# Patient Record
Sex: MALE | Race: WHITE | Employment: OTHER | URBAN - METROPOLITAN AREA
[De-identification: names, ages, dates, MRNs, and addresses within clinical notes are randomized per-mention and may not be internally consistent; named-entity substitution may affect disease eponyms.]

---

## 2018-01-31 ENCOUNTER — HOSPITAL ENCOUNTER (OUTPATIENT)
Dept: PREADMISSION TESTING | Age: 78
Discharge: HOME | End: 2018-01-31
Payer: MEDICARE

## 2018-01-31 VITALS
WEIGHT: 212 LBS | DIASTOLIC BLOOD PRESSURE: 70 MMHG | RESPIRATION RATE: 20 BRPM | HEART RATE: 67 BPM | SYSTOLIC BLOOD PRESSURE: 152 MMHG | HEIGHT: 73 IN | TEMPERATURE: 97.3 F | BODY MASS INDEX: 28.1 KG/M2

## 2018-01-31 LAB
ANION GAP SERPL CALC-SCNC: 9 MMOL/L (ref 5–15)
BASOPHILS # BLD: 0 K/UL (ref 0–0.1)
BASOPHILS NFR BLD: 0 % (ref 0–1)
BUN SERPL-MCNC: 23 MG/DL (ref 6–20)
BUN/CREAT SERPL: 16 (ref 12–20)
CALCIUM SERPL-MCNC: 9.1 MG/DL (ref 8.5–10.1)
CHLORIDE SERPL-SCNC: 108 MMOL/L (ref 97–108)
CO2 SERPL-SCNC: 25 MMOL/L (ref 21–32)
CREAT SERPL-MCNC: 1.43 MG/DL (ref 0.7–1.3)
DIFFERENTIAL METHOD BLD: ABNORMAL
EOSINOPHIL # BLD: 0.1 K/UL (ref 0–0.4)
EOSINOPHIL NFR BLD: 2 % (ref 0–7)
ERYTHROCYTE [DISTWIDTH] IN BLOOD BY AUTOMATED COUNT: 14.4 % (ref 11.5–14.5)
GLUCOSE SERPL-MCNC: 110 MG/DL (ref 65–100)
HCT VFR BLD AUTO: 47.1 % (ref 36.6–50.3)
HGB BLD-MCNC: 15.2 G/DL (ref 12.1–17)
IMM GRANULOCYTES # BLD: 0 K/UL (ref 0–0.04)
IMM GRANULOCYTES NFR BLD AUTO: 1 % (ref 0–0.5)
LYMPHOCYTES # BLD: 1.3 K/UL (ref 0.8–3.5)
LYMPHOCYTES NFR BLD: 22 % (ref 12–49)
MCH RBC QN AUTO: 29.3 PG (ref 26–34)
MCHC RBC AUTO-ENTMCNC: 32.3 G/DL (ref 30–36.5)
MCV RBC AUTO: 90.8 FL (ref 80–99)
MONOCYTES # BLD: 0.7 K/UL (ref 0–1)
MONOCYTES NFR BLD: 12 % (ref 5–13)
NEUTS SEG # BLD: 3.8 K/UL (ref 1.8–8)
NEUTS SEG NFR BLD: 64 % (ref 32–75)
NRBC # BLD: 0 K/UL (ref 0–0.01)
NRBC BLD-RTO: 0 PER 100 WBC
PLATELET # BLD AUTO: 207 K/UL (ref 150–400)
PMV BLD AUTO: 12 FL (ref 8.9–12.9)
POTASSIUM SERPL-SCNC: 5 MMOL/L (ref 3.5–5.1)
RBC # BLD AUTO: 5.19 M/UL (ref 4.1–5.7)
SODIUM SERPL-SCNC: 142 MMOL/L (ref 136–145)
WBC # BLD AUTO: 5.9 K/UL (ref 4.1–11.1)

## 2018-01-31 PROCEDURE — 80048 BASIC METABOLIC PNL TOTAL CA: CPT | Performed by: SURGERY

## 2018-01-31 PROCEDURE — 36415 COLL VENOUS BLD VENIPUNCTURE: CPT | Performed by: SURGERY

## 2018-01-31 PROCEDURE — 85025 COMPLETE CBC W/AUTO DIFF WBC: CPT | Performed by: SURGERY

## 2018-01-31 RX ORDER — LORATADINE 10 MG/1
10 TABLET ORAL DAILY
COMMUNITY
End: 2020-02-11

## 2018-01-31 RX ORDER — WARFARIN 10 MG/1
10 TABLET ORAL DAILY
COMMUNITY
End: 2020-02-11

## 2018-01-31 RX ORDER — CYANOCOBALAMIN 1000 UG/ML
1000 INJECTION, SOLUTION INTRAMUSCULAR; SUBCUTANEOUS
COMMUNITY
End: 2020-02-11

## 2018-01-31 RX ORDER — TAMSULOSIN HYDROCHLORIDE 0.4 MG/1
0.4 CAPSULE ORAL DAILY
COMMUNITY
End: 2020-02-11

## 2018-01-31 RX ORDER — DUTASTERIDE 0.5 MG/1
0.5 CAPSULE, LIQUID FILLED ORAL DAILY
COMMUNITY

## 2018-02-05 RX ORDER — LIDOCAINE HYDROCHLORIDE AND EPINEPHRINE 10; 10 MG/ML; UG/ML
30 INJECTION, SOLUTION INFILTRATION; PERINEURAL ONCE
Status: CANCELLED | OUTPATIENT
Start: 2018-02-07 | End: 2018-02-07

## 2018-02-05 NOTE — H&P
Chief Complaint: Consultation       Allergies: No Known Drug Allergies (NKDA)       Medications: warfarin 10 mg oral tablet, Allergy (Loratadine) 10 mg oral tablet, tamsulosin 0.4 mg oral capsule, dutasteride 0.5 mg oral capsule, Vitamin B12, Metformin, Fenofibrate, Aspirin 81       Medical History: Height: 6' 1\", Weight: 218 lbs    Pulmonary System: Negative  Cardiac System: Negative  Blood and Liver Systems: Negative  Neurologic and Endocrine Systems: Negative  GI,  and Reproductive Systems: Negative  Cancer: Negative     Surgical History: Prostate surgery  Blood Clot Filter  Hernia Repair       Family History: None Indicated       Social History: Smoking Status: Unknown if ever smoked                Mr. Candace Ngo is a 77-year-old male who is a patient from my previous practice, with a history of melanoma on his scalp. 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 Edmonia Sandhoff office. By their report he had a pigmented lesion present for an unknown period of time and was seen by Dr. Frances Sood for a skin exam.  He underwent a tangential shave biopsy on December 5, the report I have here that states evolving maligna melanoma in situ, lateral margin involved, from the left vertex scalp. He is referred for definitive wide excision. Review of systems reveals normal heart and lung sounds. Examination of his posterior scalp reveals an old healed vertical midline scar that measures almost 6 or 7 cm in length. To the left towards the apex of his scalp approximately 4.5 cm lateral to the well-healed scar is a superficial tangential healing shave biopsy wound without any obvious visible residual pigmentation. I had a discussion with Mr. Arroyo and his daughter who accompanied him today regarding the diagnosis of melanoma in situ and the need for a 5 mm re-excision margin.   This is performed under a conscious sedation setting with local anesthesia on an outpatient basis. I diagrammed on his scalp where the circumferential incision would be made to include an approximate 5 mm margin. The circular defect is converted to an elliptical defect, moderate undermining in the loose areolar plane on top of the periosteum underneath the galea aponeurotica, and the excision wound is closed in layers with sutures. There are limited activity restrictions for a few weeks. Risks and complications can include but are not limited to infection, pain, bleeding, partial or complete loss of flaps or grafts, recurrent skin cancers, the need for additional surgery, and cardiac and pulmonary risks that include death. His questions were answered, and we will schedule a definitive outpatient re-excision of his recent biopsy-proven melanoma in situ on the left apex of the scalp.

## 2018-02-07 ENCOUNTER — ANESTHESIA EVENT (OUTPATIENT)
Dept: MEDSURG UNIT | Age: 78
End: 2018-02-07
Payer: MEDICARE

## 2018-02-07 ENCOUNTER — HOSPITAL ENCOUNTER (OUTPATIENT)
Age: 78
Setting detail: OUTPATIENT SURGERY
Discharge: HOME | End: 2018-02-07
Attending: SURGERY | Admitting: SURGERY
Payer: MEDICARE

## 2018-02-07 ENCOUNTER — ANESTHESIA (OUTPATIENT)
Dept: MEDSURG UNIT | Age: 78
End: 2018-02-07
Payer: MEDICARE

## 2018-02-07 VITALS
DIASTOLIC BLOOD PRESSURE: 80 MMHG | SYSTOLIC BLOOD PRESSURE: 145 MMHG | WEIGHT: 212 LBS | OXYGEN SATURATION: 95 % | BODY MASS INDEX: 28.1 KG/M2 | HEART RATE: 52 BPM | RESPIRATION RATE: 20 BRPM | TEMPERATURE: 97.7 F | HEIGHT: 73 IN

## 2018-02-07 DIAGNOSIS — D03.4 MELANOMA IN SITU OF SCALP (HCC): Primary | ICD-10-CM

## 2018-02-07 LAB
GLUCOSE BLD STRIP.AUTO-MCNC: 103 MG/DL (ref 65–100)
GLUCOSE BLD STRIP.AUTO-MCNC: 109 MG/DL (ref 65–100)
SERVICE CMNT-IMP: ABNORMAL
SERVICE CMNT-IMP: ABNORMAL

## 2018-02-07 PROCEDURE — 76060000061 HC AMB SURG ANES 0.5 TO 1 HR: Performed by: SURGERY

## 2018-02-07 PROCEDURE — 77030031139 HC SUT VCRL2 J&J -A: Performed by: SURGERY

## 2018-02-07 PROCEDURE — 74011250636 HC RX REV CODE- 250/636

## 2018-02-07 PROCEDURE — 77030018836 HC SOL IRR NACL ICUM -A: Performed by: SURGERY

## 2018-02-07 PROCEDURE — 74011250636 HC RX REV CODE- 250/636: Performed by: SURGERY

## 2018-02-07 PROCEDURE — 77030020782 HC GWN BAIR PAWS FLX 3M -B

## 2018-02-07 PROCEDURE — 77030032490 HC SLV COMPR SCD KNE COVD -B: Performed by: SURGERY

## 2018-02-07 PROCEDURE — 88342 IMHCHEM/IMCYTCHM 1ST ANTB: CPT | Performed by: SURGERY

## 2018-02-07 PROCEDURE — 74011250636 HC RX REV CODE- 250/636: Performed by: ANESTHESIOLOGY

## 2018-02-07 PROCEDURE — 76210000034 HC AMBSU PH I REC 0.5 TO 1 HR: Performed by: SURGERY

## 2018-02-07 PROCEDURE — 88305 TISSUE EXAM BY PATHOLOGIST: CPT | Performed by: SURGERY

## 2018-02-07 PROCEDURE — 76030000000 HC AMB SURG OR TIME 0.5 TO 1: Performed by: SURGERY

## 2018-02-07 PROCEDURE — 76210000046 HC AMBSU PH II REC FIRST 0.5 HR: Performed by: SURGERY

## 2018-02-07 PROCEDURE — 74011000250 HC RX REV CODE- 250: Performed by: SURGERY

## 2018-02-07 PROCEDURE — 77030011640 HC PAD GRND REM COVD -A: Performed by: SURGERY

## 2018-02-07 PROCEDURE — 82962 GLUCOSE BLOOD TEST: CPT

## 2018-02-07 PROCEDURE — 77030002916 HC SUT ETHLN J&J -A: Performed by: SURGERY

## 2018-02-07 RX ORDER — SODIUM CHLORIDE 0.9 % (FLUSH) 0.9 %
5-10 SYRINGE (ML) INJECTION AS NEEDED
Status: DISCONTINUED | OUTPATIENT
Start: 2018-02-07 | End: 2018-02-07 | Stop reason: HOSPADM

## 2018-02-07 RX ORDER — LIDOCAINE HYDROCHLORIDE AND EPINEPHRINE 10; 10 MG/ML; UG/ML
INJECTION, SOLUTION INFILTRATION; PERINEURAL AS NEEDED
Status: DISCONTINUED | OUTPATIENT
Start: 2018-02-07 | End: 2018-02-07 | Stop reason: HOSPADM

## 2018-02-07 RX ORDER — MIDAZOLAM HYDROCHLORIDE 1 MG/ML
0.5 INJECTION, SOLUTION INTRAMUSCULAR; INTRAVENOUS
Status: DISCONTINUED | OUTPATIENT
Start: 2018-02-07 | End: 2018-02-07 | Stop reason: HOSPADM

## 2018-02-07 RX ORDER — FENTANYL CITRATE 50 UG/ML
INJECTION, SOLUTION INTRAMUSCULAR; INTRAVENOUS AS NEEDED
Status: DISCONTINUED | OUTPATIENT
Start: 2018-02-07 | End: 2018-02-07 | Stop reason: HOSPADM

## 2018-02-07 RX ORDER — DIPHENHYDRAMINE HYDROCHLORIDE 50 MG/ML
12.5 INJECTION, SOLUTION INTRAMUSCULAR; INTRAVENOUS AS NEEDED
Status: DISCONTINUED | OUTPATIENT
Start: 2018-02-07 | End: 2018-02-07 | Stop reason: HOSPADM

## 2018-02-07 RX ORDER — SODIUM CHLORIDE 9 MG/ML
50 INJECTION, SOLUTION INTRAVENOUS CONTINUOUS
Status: DISCONTINUED | OUTPATIENT
Start: 2018-02-07 | End: 2018-02-07 | Stop reason: HOSPADM

## 2018-02-07 RX ORDER — ONDANSETRON 2 MG/ML
4 INJECTION INTRAMUSCULAR; INTRAVENOUS AS NEEDED
Status: DISCONTINUED | OUTPATIENT
Start: 2018-02-07 | End: 2018-02-07 | Stop reason: HOSPADM

## 2018-02-07 RX ORDER — HYDROCODONE BITARTRATE AND ACETAMINOPHEN 5; 325 MG/1; MG/1
1 TABLET ORAL
Qty: 20 TAB | Refills: 0 | Status: SHIPPED | OUTPATIENT
Start: 2018-02-07 | End: 2018-09-12

## 2018-02-07 RX ORDER — MIDAZOLAM HYDROCHLORIDE 1 MG/ML
1 INJECTION, SOLUTION INTRAMUSCULAR; INTRAVENOUS AS NEEDED
Status: DISCONTINUED | OUTPATIENT
Start: 2018-02-07 | End: 2018-02-07 | Stop reason: HOSPADM

## 2018-02-07 RX ORDER — PROPOFOL 10 MG/ML
INJECTION, EMULSION INTRAVENOUS
Status: DISCONTINUED | OUTPATIENT
Start: 2018-02-07 | End: 2018-02-07 | Stop reason: HOSPADM

## 2018-02-07 RX ORDER — FENTANYL CITRATE 50 UG/ML
25 INJECTION, SOLUTION INTRAMUSCULAR; INTRAVENOUS
Status: DISCONTINUED | OUTPATIENT
Start: 2018-02-07 | End: 2018-02-07 | Stop reason: HOSPADM

## 2018-02-07 RX ORDER — SODIUM CHLORIDE, SODIUM LACTATE, POTASSIUM CHLORIDE, CALCIUM CHLORIDE 600; 310; 30; 20 MG/100ML; MG/100ML; MG/100ML; MG/100ML
125 INJECTION, SOLUTION INTRAVENOUS CONTINUOUS
Status: DISCONTINUED | OUTPATIENT
Start: 2018-02-07 | End: 2018-02-07 | Stop reason: HOSPADM

## 2018-02-07 RX ORDER — CEFAZOLIN SODIUM/WATER 2 G/20 ML
2 SYRINGE (ML) INTRAVENOUS ONCE
Status: COMPLETED | OUTPATIENT
Start: 2018-02-07 | End: 2018-02-07

## 2018-02-07 RX ORDER — SODIUM CHLORIDE 0.9 % (FLUSH) 0.9 %
5-10 SYRINGE (ML) INJECTION EVERY 8 HOURS
Status: DISCONTINUED | OUTPATIENT
Start: 2018-02-07 | End: 2018-02-07 | Stop reason: HOSPADM

## 2018-02-07 RX ORDER — FENTANYL CITRATE 50 UG/ML
50 INJECTION, SOLUTION INTRAMUSCULAR; INTRAVENOUS AS NEEDED
Status: DISCONTINUED | OUTPATIENT
Start: 2018-02-07 | End: 2018-02-07 | Stop reason: HOSPADM

## 2018-02-07 RX ORDER — LIDOCAINE HYDROCHLORIDE 10 MG/ML
0.1 INJECTION, SOLUTION EPIDURAL; INFILTRATION; INTRACAUDAL; PERINEURAL AS NEEDED
Status: DISCONTINUED | OUTPATIENT
Start: 2018-02-07 | End: 2018-02-07 | Stop reason: HOSPADM

## 2018-02-07 RX ORDER — PROPOFOL 10 MG/ML
INJECTION, EMULSION INTRAVENOUS AS NEEDED
Status: DISCONTINUED | OUTPATIENT
Start: 2018-02-07 | End: 2018-02-07 | Stop reason: HOSPADM

## 2018-02-07 RX ORDER — OXYCODONE AND ACETAMINOPHEN 5; 325 MG/1; MG/1
1 TABLET ORAL AS NEEDED
Status: DISCONTINUED | OUTPATIENT
Start: 2018-02-07 | End: 2018-02-07 | Stop reason: HOSPADM

## 2018-02-07 RX ORDER — SODIUM CHLORIDE 9 MG/ML
1000 INJECTION, SOLUTION INTRAVENOUS CONTINUOUS
Status: DISCONTINUED | OUTPATIENT
Start: 2018-02-07 | End: 2018-02-07 | Stop reason: HOSPADM

## 2018-02-07 RX ORDER — MORPHINE SULFATE 10 MG/ML
2 INJECTION, SOLUTION INTRAMUSCULAR; INTRAVENOUS
Status: DISCONTINUED | OUTPATIENT
Start: 2018-02-07 | End: 2018-02-07 | Stop reason: HOSPADM

## 2018-02-07 RX ADMIN — Medication 2 G: at 13:10

## 2018-02-07 RX ADMIN — FENTANYL CITRATE 25 MCG: 50 INJECTION, SOLUTION INTRAMUSCULAR; INTRAVENOUS at 13:05

## 2018-02-07 RX ADMIN — PROPOFOL 30 MG: 10 INJECTION, EMULSION INTRAVENOUS at 13:05

## 2018-02-07 RX ADMIN — SODIUM CHLORIDE, SODIUM LACTATE, POTASSIUM CHLORIDE, AND CALCIUM CHLORIDE 125 ML/HR: 600; 310; 30; 20 INJECTION, SOLUTION INTRAVENOUS at 12:14

## 2018-02-07 RX ADMIN — PROPOFOL 20 MCG/KG/MIN: 10 INJECTION, EMULSION INTRAVENOUS at 13:01

## 2018-02-07 NOTE — ANESTHESIA PREPROCEDURE EVALUATION
Anesthetic History   No history of anesthetic complications            Review of Systems / Medical History  Patient summary reviewed, nursing notes reviewed and pertinent labs reviewed    Pulmonary  Within defined limits                 Neuro/Psych   Within defined limits           Cardiovascular  Within defined limits          Dysrhythmias            GI/Hepatic/Renal  Within defined limits              Endo/Other  Within defined limits  Diabetes    Arthritis and cancer     Other Findings              Physical Exam    Airway  Mallampati: II  TM Distance: > 6 cm  Neck ROM: normal range of motion   Mouth opening: Normal     Cardiovascular  Regular rate and rhythm,  S1 and S2 normal,  no murmur, click, rub, or gallop             Dental  No notable dental hx       Pulmonary  Breath sounds clear to auscultation               Abdominal  GI exam deferred       Other Findings            Anesthetic Plan    ASA: 2  Anesthesia type: MAC          Induction: Intravenous  Anesthetic plan and risks discussed with: Patient

## 2018-02-07 NOTE — PROGRESS NOTES
Gave discharge instructions and prescription to patient and his nephew, questions answered and supplies given

## 2018-02-07 NOTE — OP NOTES
[facility]  ACUTE CARE OP NOTE    Name:Ashley BHARDWAJ SR..  MR#: [id number]  : 1940  ACCOUNT #: [de-identified]   DATE OF SERVICE: 2018    PREOPERATIVE DIAGNOSIS:  Malignant melanoma in situ, left apical scalp, inadequate excision margins. POSTOPERATIVE DIAGNOSIS:  Malignant melanoma in situ, left apical scalp, inadequate excision margins. PROCEDURE PERFORMED:  Definitive wide local excision with 5 mm circumferential excision margin melanoma in situ, left apical scalp, and complex closure to defect, left apical scalp, approximately 8 x 2.5 cm. SURGEON:  Dr. Dorinda Cain. ASSISTANT:  None. STAFF:  OR staff. ANESTHESIA:  IV sedation with local anesthesia. ESTIMATED BLOOD LOSS:  2 mL. INTRAVENOUS FLUIDS:  Less than or equal to a liter. SPECIMENS REMOVED:  Melanoma in situ, left apical scalp, sent to pathology for permanent analysis. COMPLICATIONS:  None. IMPLANTS:  None. INDICATION FOR PROCEDURE:  The patient is a pleasant 68-year-old male referred by Dr. Carlos Enrique Boyd with a recent diagnosis of a melanoma in situ on his left apical scalp with an inadequate excision margin. He has a longstanding history fair complexion and skin cancers and is seen by Dr. Nohemy Keller for followup skin surveillance exams. He was seen on 2017 and had a shave excision to an atypical pigmented lesion on apical of scalp to the left side of a well-healed vertical scar from a previous skin cancer excision. Pathology report revealed this was an evolving melanoma in situ with recommendation of definitive wide excision. He comes in today for that procedure. OPERATIVE PROCEDURE:  After appropriate consent was obtained, preoperative markings were placed. He was taken to the operating room and placed on the operative table in supine position. IV sedation was provided. 1% lidocaine with epinephrine was injected.   The area was sterilely prepped and draped. Based on our markings, all skin incisions were made with a #10 scalpel blade. Definitive full-thickness excision of the previous biopsy with a circumferential margin was then marked with a suture at 12 o'clock, which represented the most anterior short axis margin. It was sent to pathology for permanent analysis. The scalp flaps were then undermined in a loose areolar plane below the galea aponeurotica and on top of the periosteum for a distance of multiple centimeters in all directions. The wound was then closed in a complex fashion with 3-0 Vicryl in the galea fascial layer as an inverted interrupted stitch and then the skin was reapproximated with 4-0 nylon in vertical mattress sutures and then every other one was a simple 4-0 nylon suture. Bacitracin and a gauze bandage was placed. At the end of procedure, sponge and needle counts were reported as correct. He was awakened and transferred to recovery room in satisfactory and stable condition. He will be discharged home today in the care of his family with prescriptions and instructions and he will follow up with me within 2 weeks.       Norma Vanessa MD       [address] Leann Zabala / Elle Harrison  D: 02/07/2018 13:54     T: 02/07/2018 14:27  JOB #: [id number]

## 2018-02-07 NOTE — ROUTINE PROCESS
Patient: Chris Fields. MRN: [id number]  SSN: [id number]   YOB: 1940  Age: 76 y.o. Sex: male     Patient is status post Procedure(s):  WIDE LOCAL EXCISION MELANOMA  IN SITU LEFT SCALP .     Surgeon(s) and Role:     * Kadeem Silverman MD - Primary    Local/Dose/Irrigation: SEE MAR                  Peripheral IV 02/07/18 Left Hand (Active)                           Dressing/Packing:  Wound Scalp-DRESSING TYPE:  (4X4,HYPAFIX TAPE) (02/07/18 1300)  Splint/Cast:  ]    Other:

## 2018-02-07 NOTE — ANESTHESIA POSTPROCEDURE EVALUATION
Post-Anesthesia Evaluation and Assessment    Patient: Genoveva De La Garza Sr. MRN: [id number]  SSN: [id number]    YOB: 1940  Age: 76 y.o. Sex: male       Cardiovascular Function/Vital Signs  Visit Vitals    /67    Pulse 61    Temp 36.5 °C (97.7 °F)    Resp 17    Ht 6' 1\" (1.854 m)    Wt 96.2 kg (212 lb)    SpO2 99%    BMI 27.97 kg/m2       Patient is status post MAC anesthesia for Procedure(s):  WIDE LOCAL EXCISION MELANOMA  IN SITU LEFT SCALP . Nausea/Vomiting: None    Postoperative hydration reviewed and adequate. Pain:  Pain Scale 1: Numeric (0 - 10) (02/07/18 1400)  Pain Intensity 1: 0 (02/07/18 1400)   Managed    Neurological Status:   Neuro (WDL): Within Defined Limits (02/07/18 1400)  Neuro  LUE Motor Response: Purposeful (02/07/18 1400)  LLE Motor Response: Purposeful (02/07/18 1400)  RUE Motor Response: Purposeful (02/07/18 1400)  RLE Motor Response: Purposeful (02/07/18 1400)   At baseline    Mental Status and Level of Consciousness: Arousable    Pulmonary Status:   O2 Device: Nasal cannula (02/07/18 1344)   Adequate oxygenation and airway patent    Complications related to anesthesia: None    Post-anesthesia assessment completed.  No concerns    Signed By: Joel Torres MD     February 7, 2018

## 2018-02-07 NOTE — INTERVAL H&P NOTE
H&P Update:  Quiana Art was seen and examined. History and physical has been reviewed. The patient has been examined.  There have been no significant clinical changes since the completion of the originally dated History and Physical.    Signed By: Lalo De La Rosa MD     February 7, 2018 6:26 AM

## 2018-02-07 NOTE — IP AVS SNAPSHOT
[address] [facility] [address] [place] 
[phone] Patient: Kaylin Sen. MRN: [id number] About your hospitalization You were admitted on:  February 7, 2018 You last received care in the:  [facility] PACU You were discharged on:  February 7, 2018 Why you were hospitalized Your primary diagnosis was:  Melanoma In Situ Of Scalp (Hcc) Follow-up Information Follow up With Details Comments Contact Info Toño Moore MD In 2 weeks  [facility] [address] 
[phone] Loree Gray MD   [facility] [id number] 
[phone] Discharge Orders None A check dejuan indicates which time of day the medication should be taken. My Medications START taking these medications Instructions Each Dose to Equal  
 Morning Noon Evening Bedtime HYDROcodone-acetaminophen 5-325 mg per tablet Commonly known as:  Orin Strong Your last dose was: Your next dose is: Take 1 Tab by mouth every six (6) hours as needed for Pain. Max Daily Amount: 4 Tabs. 1 Tab CONTINUE taking these medications Instructions Each Dose to Equal  
 Morning Noon Evening Bedtime COUMADIN 10 mg tablet Generic drug:  warfarin Your last dose was: Your next dose is: Take 10 mg by mouth daily. 10 mg  
    
   
   
   
  
 dutasteride 0.5 mg capsule Commonly known as:  AVODART Your last dose was: Your next dose is: Take 0.5 mg by mouth daily. 0.5 mg  
    
   
   
   
  
 fenofibrate 54 mg tablet Commonly known as:  LOFIBRA Your last dose was: Your next dose is: Take  by mouth daily. loratadine 10 mg tablet Commonly known as:  Samir Sean Your last dose was: Your next dose is: Take 10 mg by mouth daily. 10 mg  
    
   
   
   
  
 metFORMIN  mg tablet Commonly known as:  GLUCOPHAGE XR Your last dose was: Your next dose is: Take 750 mg by mouth two (2) times a day. 750 mg  
    
   
   
   
  
 tamsulosin 0.4 mg capsule Commonly known as:  FLOMAX Your last dose was: Your next dose is: Take 0.4 mg by mouth daily. 0.4 mg  
    
   
   
   
  
 VITAMIN B-12 1,000 mcg/mL injection Generic drug:  cyanocobalamin Your last dose was: Your next dose is:    
   
   
 1,000 mcg by IntraMUSCular route every seven (7) days. 1000 mcg Where to Get Your Medications Information on where to get these meds will be given to you by the nurse or doctor. ! Ask your nurse or doctor about these medications HYDROcodone-acetaminophen 5-325 mg per tablet Discharge Instructions Resume any and all pre op medications and diet Keep head elevated at night at least 35 degrees, do not lay flat for about one week May resume daily shower beginning tomorrow Remove any cover gauze, shower, place antibiotic ointment of choice over incision, place clean gauze, and may repeat daily No strenuous activity for about one week May use prescription pain medication OR over the counter medications for pain/discomfort Call Dr. Leonarda Gonzales at [phone] for follow up appointment in two weeks DISCHARGE SUMMARY from Nurse PATIENT INSTRUCTIONS: 
 
 
F-face looks uneven A-arms unable to move or move unevenly S-speech slurred or non-existent T-time-call 911 as soon as signs and symptoms begin-DO NOT go  
 Back to bed or wait to see if you get better-TIME IS BRAIN. Warning Signs of HEART ATTACK Call 911 if you have these symptoms: 
? Chest discomfort. Most heart attacks involve discomfort in the center of the chest that lasts more than a few minutes, or that goes away and comes back. It can feel like uncomfortable pressure, squeezing, fullness, or pain. ? Discomfort in other areas of the upper body. Symptoms can include pain or discomfort in one or both arms, the back, neck, jaw, or stomach. ? Shortness of breath with or without chest discomfort. ? Other signs may include breaking out in a cold sweat, nausea, or lightheadedness. Don't wait more than five minutes to call [address]! Fast action can save your life. Calling 911 is almost always the fastest way to get lifesaving treatment. Emergency Medical Services staff can begin treatment when they arrive  up to an hour sooner than if someone gets to the hospital by car. The discharge information has been reviewed with the patient and caregiver. The patient and caregiver verbalized understanding. Discharge medications reviewed with the patient and caregiver and appropriate educational materials and side effects teaching were provided. ___________________________________________________________________________________________________________________________________ Introducing [facility] & HEALTH SERVICES! Helen Joe introduces [employer] patient portal. Now you can access parts of your medical record, email your doctor's office, and request medication refills online. 1. In your internet browser, go to [email or web address] 2. Click on the First Time User? Click Here link in the Sign In box. You will see the New Member Sign Up page. 3. Enter your [employer] Access Code exactly as it appears below. You will not need to use this code after youve completed the sign-up process.  If you do not sign up before the expiration date, you must request a new code. · [employer] Access Code: [email or web address] Expires: 5/1/2018  7:56 AM 
 
4. Enter the last four digits of your Social Security Number (xxxx) and Date of Birth (mm/dd/yyyy) as indicated and click Submit. You will be taken to the next sign-up page. 5. Create a [employer] ID. This will be your [employer] login ID and cannot be changed, so think of one that is secure and easy to remember. 6. Create a [employer] password. You can change your password at any time. 7. Enter your Password Reset Question and Answer. This can be used at a later time if you forget your password. 8. Enter your e-mail address. You will receive e-mail notification when new information is available in [address] 9. Click Sign Up. You can now view and download portions of your medical record. 10. Click the Download Summary menu link to download a portable copy of your medical information. If you have questions, please visit the Frequently Asked Questions section of the [employer] website. Remember, [employer] is NOT to be used for urgent needs. For medical emergencies, dial 911. Now available from your iPhone and Android! Providers Seen During Your Hospitalization Provider Specialty Primary office phone Adebayo Walsh  Surgery [phone] Your Primary Care Physician (PCP) Primary Care Physician Office Phone Office Fax Rashaad [facility] [phone] You are allergic to the following No active allergies Recent Documentation Height Weight BMI Smoking Status 1.854 m 96.2 kg 27.97 kg/m2 Former Smoker Emergency Contacts Name Discharge Info Relation Home Work Mobile Doug Arroyo Jr DISCHARGE CAREGIVER [3] Son [22] [phone] Bennie Little DISCHARGE CAREGIVER [3] Other Relative [6] [phone] Patient Belongings The following personal items are in your possession at time of discharge: 
  Dental Appliances:  (labeled and sent to PACU)                Clothing:  (in locker) Please provide this summary of care documentation to your next provider. Signatures-by signing, you are acknowledging that this After Visit Summary has been reviewed with you and you have received a copy. Patient Signature:  ____________________________________________________________ Date:  ____________________________________________________________  
  
Nanwalek Senna Provider Signature:  ____________________________________________________________ Date:  ____________________________________________________________

## 2018-02-07 NOTE — BRIEF OP NOTE
BRIEF OPERATIVE NOTE    Date of Procedure: 2/7/2018   Preoperative Diagnosis: MELANOMA LEFT SCALP   Postoperative Diagnosis: MELANOMA LEFT SCALP     Procedure(s):  WIDE LOCAL EXCISION MELANOMA  IN SITU LEFT SCALP   Surgeon(s) and Role:     * Sharath Desai MD - Primary         Assistant Staff: None      Surgical Staff:  Circ-1: Lucy Chisholm RN  Scrub Tech-1: Jero Gutierrez  Event Time In   Incision Start 1315   Incision Close 1332     Anesthesia: MAC   Estimated Blood Loss: 2  Specimens:   ID Type Source Tests Collected by Time Destination   1 : MELANOMA LEFT SCALP IN SITU Fresh Tissue  Sharath Desai MD 2/7/2018 1233 Pathology      Findings: normal   Complications: none  Implants: * No implants in log *

## 2018-02-07 NOTE — DISCHARGE INSTRUCTIONS
Resume any and all pre op medications and diet  Keep head elevated at night at least 35 degrees, do not lay flat for about one week  May resume daily shower beginning tomorrow  Remove any cover gauze, shower, place antibiotic ointment of choice over incision, place clean gauze, and may repeat daily  No strenuous activity for about one week  May use prescription pain medication OR over the counter medications for pain/discomfort  Call Dr. Johnny Sheffield at [phone] for follow up appointment in two weeks      DISCHARGE SUMMARY from Nurse    PATIENT INSTRUCTIONS:    After general anesthesia or intravenous sedation, for 24 hours or while taking prescription Narcotics:  · Limit your activities  · Do not drive and operate hazardous machinery  · Do not make important personal or business decisions  · Do  not drink alcoholic beverages  · If you have not urinated within 8 hours after discharge, please contact your surgeon on call. Report the following to your surgeon:  · Excessive pain, swelling, redness or odor of or around the surgical area  · Temperature over 100.5  · Nausea and vomiting lasting longer than 4 hours or if unable to take medications  · Any signs of decreased circulation or nerve impairment to extremity: change in color, persistent  numbness, tingling, coldness or increase pain  · Any questions    What to do at Home:  Recommended activity: See surgical instructions. If you experience any of the following symptoms as noted above, please follow up with Dr. Johnny Sheffield. *  Please give a list of your current medications to your Primary Care Provider. *  Please update this list whenever your medications are discontinued, doses are      changed, or new medications (including over-the-counter products) are added. *  Please carry medication information at all times in case of emergency situations.     These are general instructions for a healthy lifestyle:    No smoking/ No tobacco products/ Avoid exposure to second hand smoke  Surgeon General's Warning:  Quitting smoking now greatly reduces serious risk to your health. Obesity, smoking, and sedentary lifestyle greatly increases your risk for illness    A healthy diet, regular physical exercise & weight monitoring are important for maintaining a healthy lifestyle    You may be retaining fluid if you have a history of heart failure or if you experience any of the following symptoms:  Weight gain of 3 pounds or more overnight or 5 pounds in a week, increased swelling in our hands or feet or shortness of breath while lying flat in bed. Please call your doctor as soon as you notice any of these symptoms; do not wait until your next office visit. Recognize signs and symptoms of STROKE:    F-face looks uneven    A-arms unable to move or move unevenly    S-speech slurred or non-existent    T-time-call 911 as soon as signs and symptoms begin-DO NOT go       Back to bed or wait to see if you get better-TIME IS BRAIN. Warning Signs of HEART ATTACK     Call 911 if you have these symptoms:   Chest discomfort. Most heart attacks involve discomfort in the center of the chest that lasts more than a few minutes, or that goes away and comes back. It can feel like uncomfortable pressure, squeezing, fullness, or pain.  Discomfort in other areas of the upper body. Symptoms can include pain or discomfort in one or both arms, the back, neck, jaw, or stomach.  Shortness of breath with or without chest discomfort.  Other signs may include breaking out in a cold sweat, nausea, or lightheadedness. Don't wait more than five minutes to call 911 - MINUTES MATTER! Fast action can save your life. Calling 911 is almost always the fastest way to get lifesaving treatment. Emergency Medical Services staff can begin treatment when they arrive -- up to an hour sooner than if someone gets to the hospital by car. The discharge information has been reviewed with the patient and caregiver. The patient and caregiver verbalized understanding. Discharge medications reviewed with the patient and caregiver and appropriate educational materials and side effects teaching were provided.   ___________________________________________________________________________________________________________________________________

## 2018-09-12 ENCOUNTER — HOSPITAL ENCOUNTER (OUTPATIENT)
Dept: PREADMISSION TESTING | Age: 78
Discharge: HOME | End: 2018-09-12
Payer: MEDICARE

## 2018-09-12 VITALS
HEART RATE: 79 BPM | TEMPERATURE: 97.8 F | SYSTOLIC BLOOD PRESSURE: 134 MMHG | BODY MASS INDEX: 29.57 KG/M2 | WEIGHT: 223.13 LBS | HEIGHT: 73 IN | DIASTOLIC BLOOD PRESSURE: 72 MMHG

## 2018-09-12 LAB
ANION GAP SERPL CALC-SCNC: 6 MMOL/L (ref 5–15)
BASOPHILS # BLD: 0 K/UL (ref 0–0.1)
BASOPHILS NFR BLD: 0 % (ref 0–1)
BUN SERPL-MCNC: 18 MG/DL (ref 6–20)
BUN/CREAT SERPL: 15 (ref 12–20)
CALCIUM SERPL-MCNC: 8.4 MG/DL (ref 8.5–10.1)
CHLORIDE SERPL-SCNC: 108 MMOL/L (ref 97–108)
CO2 SERPL-SCNC: 28 MMOL/L (ref 21–32)
CREAT SERPL-MCNC: 1.2 MG/DL (ref 0.7–1.3)
DIFFERENTIAL METHOD BLD: ABNORMAL
EOSINOPHIL # BLD: 0.1 K/UL (ref 0–0.4)
EOSINOPHIL NFR BLD: 2 % (ref 0–7)
ERYTHROCYTE [DISTWIDTH] IN BLOOD BY AUTOMATED COUNT: 14.5 % (ref 11.5–14.5)
GLUCOSE SERPL-MCNC: 107 MG/DL (ref 65–100)
HCT VFR BLD AUTO: 34.7 % (ref 36.6–50.3)
HGB BLD-MCNC: 10.3 G/DL (ref 12.1–17)
IMM GRANULOCYTES # BLD: 0 K/UL (ref 0–0.04)
IMM GRANULOCYTES NFR BLD AUTO: 1 % (ref 0–0.5)
LYMPHOCYTES # BLD: 0.9 K/UL (ref 0.8–3.5)
LYMPHOCYTES NFR BLD: 15 % (ref 12–49)
MCH RBC QN AUTO: 26.3 PG (ref 26–34)
MCHC RBC AUTO-ENTMCNC: 29.7 G/DL (ref 30–36.5)
MCV RBC AUTO: 88.7 FL (ref 80–99)
MONOCYTES # BLD: 0.6 K/UL (ref 0–1)
MONOCYTES NFR BLD: 11 % (ref 5–13)
NEUTS SEG # BLD: 4 K/UL (ref 1.8–8)
NEUTS SEG NFR BLD: 71 % (ref 32–75)
NRBC # BLD: 0 K/UL (ref 0–0.01)
NRBC BLD-RTO: 0 PER 100 WBC
PLATELET # BLD AUTO: 265 K/UL (ref 150–400)
PMV BLD AUTO: 11.7 FL (ref 8.9–12.9)
POTASSIUM SERPL-SCNC: 4.3 MMOL/L (ref 3.5–5.1)
RBC # BLD AUTO: 3.91 M/UL (ref 4.1–5.7)
SODIUM SERPL-SCNC: 142 MMOL/L (ref 136–145)
WBC # BLD AUTO: 5.6 K/UL (ref 4.1–11.1)

## 2018-09-12 PROCEDURE — 85025 COMPLETE CBC W/AUTO DIFF WBC: CPT | Performed by: SURGERY

## 2018-09-12 PROCEDURE — 93005 ELECTROCARDIOGRAM TRACING: CPT

## 2018-09-12 PROCEDURE — 36415 COLL VENOUS BLD VENIPUNCTURE: CPT | Performed by: SURGERY

## 2018-09-12 PROCEDURE — 80048 BASIC METABOLIC PNL TOTAL CA: CPT | Performed by: SURGERY

## 2018-09-12 NOTE — PERIOP NOTES
PREOPERATIVE INSTRUCTIONS REVIEWED WITH PATIENT. PATIENT GIVEN SSI INFECTIONS SHEET. PATIENT WAS GIVEN THE OPPORTUNITY TO ASK QUESTIONS ON THE INFORMATION PROVIDED.

## 2018-09-13 LAB
ATRIAL RATE: 64 BPM
CALCULATED P AXIS, ECG09: 56 DEGREES
CALCULATED R AXIS, ECG10: -8 DEGREES
CALCULATED T AXIS, ECG11: 22 DEGREES
DIAGNOSIS, 93000: NORMAL
P-R INTERVAL, ECG05: 184 MS
Q-T INTERVAL, ECG07: 398 MS
QRS DURATION, ECG06: 72 MS
QTC CALCULATION (BEZET), ECG08: 410 MS
VENTRICULAR RATE, ECG03: 64 BPM

## 2018-09-24 NOTE — H&P
Tierra Gutierrez : 1940 DOS: 2018 Chief Complaint: Consultation Allergies: No Known Drug Allergies (NKDA) Medications: warfarin 10 mg oral tablet, Allergy (Loratadine) 10 mg oral tablet, tamsulosin 0.4 mg oral capsule, dutasteride 0.5 mg oral capsule, Vitamin B12, Metformin, Fenofibrate, Aspirin 81 Medical History: Height: 6' 1\", Weight: 218 lbs Pulmonary System: Negative Cardiac System: Negative Blood and Liver Systems: Negative Neurologic and Endocrine Systems: Negative GI,  and Reproductive Systems: Negative Cancer: Negative Surgical History: Prostate surgery  Blood Clot Filter  Hernia Repair Melanoma Excision Family History: None Indicated Social History: Smoking Status: Unknown if ever smoked Mr. 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 He is doing well without major problems, and has been using topical antibiotic ointment without a dressing. Review of systems reveals normal heart and lung sounds. Examination demonstrates the excisional wound is healing without signs of infection, and nylon sutures were removed today. His pathology report confirms there was residual microscopic melanoma in situ involving the entire 3:00 to 9:00 peripheral margin. I had a lengthy discussion again with Mr. Arroyo regarding his pathology results. Unfortunately, we still do not have adequate clear negative margins, despite no evidence of residual pigmentation or any lesion in the scalp area.   I think now it is time that we go to the operating room under a conscious sedation setting with local anesthesia where I can excise a larger patch of the scalp, facilitate extensive undermining in the loose areolar plane on top of the periosteum and underneath the galea, to facilitate wound closure. There will be some dissolvable sutures and nylon sutures placed, and there are activity restrictions while he is healing which can extend beyond 4 weeks. Risks and complications can include but are not limited to infection, pain, bleeding, partial or complete loss of flaps or grafts, recurrent skin cancers, the need for additional surgery, and cardiac and pulmonary risks that include death. His questions were answered and we will schedule an outpatient definitive wide local excision of the residual/inadequately excised melanoma in situ from the left apical scalp.

## 2018-09-25 ENCOUNTER — ANESTHESIA EVENT (OUTPATIENT)
Dept: MEDSURG UNIT | Age: 78
End: 2018-09-25
Payer: MEDICARE

## 2018-09-26 ENCOUNTER — HOSPITAL ENCOUNTER (OUTPATIENT)
Age: 78
Setting detail: OUTPATIENT SURGERY
Discharge: HOME | End: 2018-09-26
Attending: SURGERY | Admitting: SURGERY
Payer: MEDICARE

## 2018-09-26 ENCOUNTER — ANESTHESIA (OUTPATIENT)
Dept: MEDSURG UNIT | Age: 78
End: 2018-09-26
Payer: MEDICARE

## 2018-09-26 VITALS
HEIGHT: 73 IN | HEART RATE: 70 BPM | OXYGEN SATURATION: 96 % | DIASTOLIC BLOOD PRESSURE: 60 MMHG | RESPIRATION RATE: 20 BRPM | TEMPERATURE: 97.5 F | WEIGHT: 223 LBS | BODY MASS INDEX: 29.55 KG/M2 | SYSTOLIC BLOOD PRESSURE: 119 MMHG

## 2018-09-26 DIAGNOSIS — D03.4 MELANOMA IN SITU OF SCALP (HCC): Primary | ICD-10-CM

## 2018-09-26 LAB
GLUCOSE BLD STRIP.AUTO-MCNC: 103 MG/DL (ref 65–100)
GLUCOSE BLD STRIP.AUTO-MCNC: 152 MG/DL (ref 65–100)
SERVICE CMNT-IMP: ABNORMAL
SERVICE CMNT-IMP: ABNORMAL

## 2018-09-26 PROCEDURE — 77030020782 HC GWN BAIR PAWS FLX 3M -B

## 2018-09-26 PROCEDURE — 76060000061 HC AMB SURG ANES 0.5 TO 1 HR: Performed by: SURGERY

## 2018-09-26 PROCEDURE — 88305 TISSUE EXAM BY PATHOLOGIST: CPT | Performed by: SURGERY

## 2018-09-26 PROCEDURE — 77030032490 HC SLV COMPR SCD KNE COVD -B: Performed by: SURGERY

## 2018-09-26 PROCEDURE — 77030011640 HC PAD GRND REM COVD -A: Performed by: SURGERY

## 2018-09-26 PROCEDURE — 77030002996 HC SUT SLK J&J -A: Performed by: SURGERY

## 2018-09-26 PROCEDURE — 82962 GLUCOSE BLOOD TEST: CPT

## 2018-09-26 PROCEDURE — 74011250636 HC RX REV CODE- 250/636

## 2018-09-26 PROCEDURE — 76210000046 HC AMBSU PH II REC FIRST 0.5 HR: Performed by: SURGERY

## 2018-09-26 PROCEDURE — 77030002916 HC SUT ETHLN J&J -A: Performed by: SURGERY

## 2018-09-26 PROCEDURE — 88342 IMHCHEM/IMCYTCHM 1ST ANTB: CPT | Performed by: SURGERY

## 2018-09-26 PROCEDURE — 76210000034 HC AMBSU PH I REC 0.5 TO 1 HR: Performed by: SURGERY

## 2018-09-26 PROCEDURE — 74011250636 HC RX REV CODE- 250/636: Performed by: SURGERY

## 2018-09-26 PROCEDURE — 76030000000 HC AMB SURG OR TIME 0.5 TO 1: Performed by: SURGERY

## 2018-09-26 PROCEDURE — 74011000250 HC RX REV CODE- 250

## 2018-09-26 PROCEDURE — 74011000250 HC RX REV CODE- 250: Performed by: SURGERY

## 2018-09-26 PROCEDURE — 77030031139 HC SUT VCRL2 J&J -A: Performed by: SURGERY

## 2018-09-26 PROCEDURE — 77030018836 HC SOL IRR NACL ICUM -A: Performed by: SURGERY

## 2018-09-26 PROCEDURE — 74011250636 HC RX REV CODE- 250/636: Performed by: ANESTHESIOLOGY

## 2018-09-26 RX ORDER — PHENYLEPHRINE HCL IN 0.9% NACL 0.4MG/10ML
SYRINGE (ML) INTRAVENOUS AS NEEDED
Status: DISCONTINUED | OUTPATIENT
Start: 2018-09-26 | End: 2018-09-26 | Stop reason: HOSPADM

## 2018-09-26 RX ORDER — SODIUM CHLORIDE 0.9 % (FLUSH) 0.9 %
5-10 SYRINGE (ML) INJECTION EVERY 8 HOURS
Status: DISCONTINUED | OUTPATIENT
Start: 2018-09-26 | End: 2018-09-26 | Stop reason: HOSPADM

## 2018-09-26 RX ORDER — FENTANYL CITRATE 50 UG/ML
INJECTION, SOLUTION INTRAMUSCULAR; INTRAVENOUS AS NEEDED
Status: DISCONTINUED | OUTPATIENT
Start: 2018-09-26 | End: 2018-09-26 | Stop reason: HOSPADM

## 2018-09-26 RX ORDER — CEPHALEXIN 500 MG/1
500 CAPSULE ORAL 4 TIMES DAILY
Qty: 20 CAP | Refills: 1 | Status: SHIPPED | OUTPATIENT
Start: 2018-09-26 | End: 2018-10-01

## 2018-09-26 RX ORDER — MIDAZOLAM HYDROCHLORIDE 1 MG/ML
1 INJECTION, SOLUTION INTRAMUSCULAR; INTRAVENOUS AS NEEDED
Status: DISCONTINUED | OUTPATIENT
Start: 2018-09-26 | End: 2018-09-26 | Stop reason: HOSPADM

## 2018-09-26 RX ORDER — DIPHENHYDRAMINE HYDROCHLORIDE 50 MG/ML
12.5 INJECTION, SOLUTION INTRAMUSCULAR; INTRAVENOUS AS NEEDED
Status: DISCONTINUED | OUTPATIENT
Start: 2018-09-26 | End: 2018-09-26 | Stop reason: HOSPADM

## 2018-09-26 RX ORDER — ONDANSETRON 2 MG/ML
INJECTION INTRAMUSCULAR; INTRAVENOUS AS NEEDED
Status: DISCONTINUED | OUTPATIENT
Start: 2018-09-26 | End: 2018-09-26 | Stop reason: HOSPADM

## 2018-09-26 RX ORDER — PROPOFOL 10 MG/ML
INJECTION, EMULSION INTRAVENOUS
Status: DISCONTINUED | OUTPATIENT
Start: 2018-09-26 | End: 2018-09-26 | Stop reason: HOSPADM

## 2018-09-26 RX ORDER — FENTANYL CITRATE 50 UG/ML
50 INJECTION, SOLUTION INTRAMUSCULAR; INTRAVENOUS AS NEEDED
Status: DISCONTINUED | OUTPATIENT
Start: 2018-09-26 | End: 2018-09-26 | Stop reason: HOSPADM

## 2018-09-26 RX ORDER — DEXMEDETOMIDINE HYDROCHLORIDE 4 UG/ML
INJECTION, SOLUTION INTRAVENOUS AS NEEDED
Status: DISCONTINUED | OUTPATIENT
Start: 2018-09-26 | End: 2018-09-26 | Stop reason: HOSPADM

## 2018-09-26 RX ORDER — LIDOCAINE HYDROCHLORIDE 10 MG/ML
0.1 INJECTION, SOLUTION EPIDURAL; INFILTRATION; INTRACAUDAL; PERINEURAL AS NEEDED
Status: DISCONTINUED | OUTPATIENT
Start: 2018-09-26 | End: 2018-09-26 | Stop reason: HOSPADM

## 2018-09-26 RX ORDER — ROPIVACAINE HYDROCHLORIDE 5 MG/ML
150 INJECTION, SOLUTION EPIDURAL; INFILTRATION; PERINEURAL AS NEEDED
Status: DISCONTINUED | OUTPATIENT
Start: 2018-09-26 | End: 2018-09-26 | Stop reason: HOSPADM

## 2018-09-26 RX ORDER — HYDROCODONE BITARTRATE AND ACETAMINOPHEN 5; 325 MG/1; MG/1
1 TABLET ORAL
Qty: 20 TAB | Refills: 0 | Status: SHIPPED | OUTPATIENT
Start: 2018-09-26 | End: 2020-02-11

## 2018-09-26 RX ORDER — SODIUM CHLORIDE, SODIUM LACTATE, POTASSIUM CHLORIDE, CALCIUM CHLORIDE 600; 310; 30; 20 MG/100ML; MG/100ML; MG/100ML; MG/100ML
100 INJECTION, SOLUTION INTRAVENOUS CONTINUOUS
Status: DISCONTINUED | OUTPATIENT
Start: 2018-09-26 | End: 2018-09-26 | Stop reason: HOSPADM

## 2018-09-26 RX ORDER — PROPOFOL 10 MG/ML
INJECTION, EMULSION INTRAVENOUS AS NEEDED
Status: DISCONTINUED | OUTPATIENT
Start: 2018-09-26 | End: 2018-09-26 | Stop reason: HOSPADM

## 2018-09-26 RX ORDER — SODIUM CHLORIDE 0.9 % (FLUSH) 0.9 %
5-10 SYRINGE (ML) INJECTION AS NEEDED
Status: DISCONTINUED | OUTPATIENT
Start: 2018-09-26 | End: 2018-09-26 | Stop reason: HOSPADM

## 2018-09-26 RX ORDER — LIDOCAINE HYDROCHLORIDE 20 MG/ML
INJECTION, SOLUTION EPIDURAL; INFILTRATION; INTRACAUDAL; PERINEURAL AS NEEDED
Status: DISCONTINUED | OUTPATIENT
Start: 2018-09-26 | End: 2018-09-26 | Stop reason: HOSPADM

## 2018-09-26 RX ORDER — LIDOCAINE HYDROCHLORIDE AND EPINEPHRINE 10; 10 MG/ML; UG/ML
30 INJECTION, SOLUTION INFILTRATION; PERINEURAL ONCE
Status: COMPLETED | OUTPATIENT
Start: 2018-09-26 | End: 2018-09-26

## 2018-09-26 RX ORDER — MIDAZOLAM HYDROCHLORIDE 1 MG/ML
0.5 INJECTION, SOLUTION INTRAMUSCULAR; INTRAVENOUS
Status: DISCONTINUED | OUTPATIENT
Start: 2018-09-26 | End: 2018-09-26 | Stop reason: HOSPADM

## 2018-09-26 RX ORDER — SODIUM CHLORIDE, SODIUM LACTATE, POTASSIUM CHLORIDE, CALCIUM CHLORIDE 600; 310; 30; 20 MG/100ML; MG/100ML; MG/100ML; MG/100ML
INJECTION, SOLUTION INTRAVENOUS
Status: DISCONTINUED | OUTPATIENT
Start: 2018-09-26 | End: 2018-09-26 | Stop reason: HOSPADM

## 2018-09-26 RX ORDER — CEFAZOLIN SODIUM/WATER 2 G/20 ML
2 SYRINGE (ML) INTRAVENOUS ONCE
Status: COMPLETED | OUTPATIENT
Start: 2018-09-26 | End: 2018-09-26

## 2018-09-26 RX ORDER — FENTANYL CITRATE 50 UG/ML
25 INJECTION, SOLUTION INTRAMUSCULAR; INTRAVENOUS
Status: DISCONTINUED | OUTPATIENT
Start: 2018-09-26 | End: 2018-09-26 | Stop reason: HOSPADM

## 2018-09-26 RX ADMIN — LIDOCAINE HYDROCHLORIDE 20 MG: 20 INJECTION, SOLUTION EPIDURAL; INFILTRATION; INTRACAUDAL; PERINEURAL at 07:25

## 2018-09-26 RX ADMIN — SODIUM CHLORIDE, SODIUM LACTATE, POTASSIUM CHLORIDE, CALCIUM CHLORIDE: 600; 310; 30; 20 INJECTION, SOLUTION INTRAVENOUS at 07:15

## 2018-09-26 RX ADMIN — FENTANYL CITRATE 25 MCG: 50 INJECTION, SOLUTION INTRAMUSCULAR; INTRAVENOUS at 07:25

## 2018-09-26 RX ADMIN — Medication 80 MCG: at 08:07

## 2018-09-26 RX ADMIN — Medication 2 G: at 07:29

## 2018-09-26 RX ADMIN — PROPOFOL 75 MCG/KG/MIN: 10 INJECTION, EMULSION INTRAVENOUS at 07:25

## 2018-09-26 RX ADMIN — ONDANSETRON 4 MG: 2 INJECTION INTRAMUSCULAR; INTRAVENOUS at 07:35

## 2018-09-26 RX ADMIN — Medication 80 MCG: at 08:00

## 2018-09-26 RX ADMIN — Medication 80 MCG: at 07:50

## 2018-09-26 RX ADMIN — PROPOFOL 20 MG: 10 INJECTION, EMULSION INTRAVENOUS at 07:25

## 2018-09-26 RX ADMIN — DEXMEDETOMIDINE HYDROCHLORIDE 4 MCG: 4 INJECTION, SOLUTION INTRAVENOUS at 07:35

## 2018-09-26 RX ADMIN — FENTANYL CITRATE 25 MCG: 50 INJECTION, SOLUTION INTRAMUSCULAR; INTRAVENOUS at 07:30

## 2018-09-26 RX ADMIN — PROPOFOL 20 MG: 10 INJECTION, EMULSION INTRAVENOUS at 07:30

## 2018-09-26 RX ADMIN — Medication 80 MCG: at 07:46

## 2018-09-26 RX ADMIN — SODIUM CHLORIDE, SODIUM LACTATE, POTASSIUM CHLORIDE, AND CALCIUM CHLORIDE 100 ML/HR: 600; 310; 30; 20 INJECTION, SOLUTION INTRAVENOUS at 06:53

## 2018-09-26 RX ADMIN — DEXMEDETOMIDINE HYDROCHLORIDE 4 MCG: 4 INJECTION, SOLUTION INTRAVENOUS at 07:58

## 2018-09-26 RX ADMIN — Medication 120 MCG: at 08:03

## 2018-09-26 NOTE — DISCHARGE INSTRUCTIONS
Leave the compression garment on and dry for 48 hours, then may remove for shower. Resume any pre op medications or diet  Keep head elevated at night at least 35 degrees, do not lay flat for about one week  NO strenuous activity for one week  When you remove the surgical dressing, shower like you normally would, place antibiotic ointment of choice over the incision with clean gauze, and may repeat daily for one week  Call Dr Kyle Patel at [phone] for follow up appointment in 2 weeks        DISCHARGE SUMMARY from Nurse    PATIENT INSTRUCTIONS:    After general anesthesia or intravenous sedation, for 24 hours or while taking prescription Narcotics:  · Limit your activities  · Do not drive and operate hazardous machinery  · Do not make important personal or business decisions  · Do  not drink alcoholic beverages  · If you have not urinated within 8 hours after discharge, please contact your surgeon on call. Report the following to your surgeon:  · Excessive pain, swelling, redness or odor of or around the surgical area  · Temperature over 100.5  · Nausea and vomiting lasting longer than 4 hours or if unable to take medications  · Any signs of decreased circulation or nerve impairment to extremity: change in color, persistent  numbness, tingling, coldness or increase pain  · Any questions    What to do at Home:      *  Please give a list of your current medications to your Primary Care Provider. *  Please update this list whenever your medications are discontinued, doses are      changed, or new medications (including over-the-counter products) are added. *  Please carry medication information at all times in case of emergency situations. These are general instructions for a healthy lifestyle:    No smoking/ No tobacco products/ Avoid exposure to second hand smoke  Surgeon General's Warning:  Quitting smoking now greatly reduces serious risk to your health.     Obesity, smoking, and sedentary lifestyle greatly increases your risk for illness    A healthy diet, regular physical exercise & weight monitoring are important for maintaining a healthy lifestyle    You may be retaining fluid if you have a history of heart failure or if you experience any of the following symptoms:  Weight gain of 3 pounds or more overnight or 5 pounds in a week, increased swelling in our hands or feet or shortness of breath while lying flat in bed. Please call your doctor as soon as you notice any of these symptoms; do not wait until your next office visit. Recognize signs and symptoms of STROKE:    F-face looks uneven    A-arms unable to move or move unevenly    S-speech slurred or non-existent    T-time-call 911 as soon as signs and symptoms begin-DO NOT go       Back to bed or wait to see if you get better-TIME IS BRAIN. Warning Signs of HEART ATTACK     Call 911 if you have these symptoms:   Chest discomfort. Most heart attacks involve discomfort in the center of the chest that lasts more than a few minutes, or that goes away and comes back. It can feel like uncomfortable pressure, squeezing, fullness, or pain.  Discomfort in other areas of the upper body. Symptoms can include pain or discomfort in one or both arms, the back, neck, jaw, or stomach.  Shortness of breath with or without chest discomfort.  Other signs may include breaking out in a cold sweat, nausea, or lightheadedness. Don't wait more than five minutes to call 911 - MINUTES MATTER! Fast action can save your life. Calling 911 is almost always the fastest way to get lifesaving treatment. Emergency Medical Services staff can begin treatment when they arrive -- up to an hour sooner than if someone gets to the hospital by car. The discharge information has been reviewed with the patient. The patient verbalized understanding.   Discharge medications reviewed with the patient and appropriate educational materials and side effects teaching were provided.   ___________________________________________________________________________________________________________________________________

## 2018-09-26 NOTE — OP NOTES
[address] 
OPERATIVE REPORT Name:Leroy BHARDWAJ SR.. 
MR#: [id number] : 1940 ACCOUNT #: [de-identified] DATE OF SERVICE: 2018 PREOPERATIVE DIAGNOSIS:  Residual melanoma in situ, scalp, left apical scalp approximately 2.5 x 2.5 cm. POSTOPERATIVE DIAGNOSIS:  Residual melanoma in situ, scalp, left apical scalp approximately 2.5 x 2.5 cm. PROCEDURE PERFORMED:  Wide local excision melanoma in situ, scalp 3 cm and complex closure to defect on scalp 9 x 3 cm. SURGEON:  Nancy Bazan MD 
 
ANESTHESIA:  IV sedation with local anesthesia. INDICATION FOR PROCEDURE:  The patient is a pleasant 59-year-old male who has been seen in the office for multiple attempts at excision of a melanoma in situ from his left apical scalp. He was initially referred by his dermatologist after shave biopsy revealed melanoma in situ. He has had history of previous melanoma and nonmelanoma skin cancers. An attempt in the office setting under local anesthesia was made to excise this as the shave defect was approximately 6-7 mm in size. Multiple attempts at wide local excision with at least a 5 mm reexcision margin have demonstrated positive margins. Because it appears as though there is a greater degree of extension of melanoma in situ that is not visible in the soft tissues, he is brought to the operating room today to facilitate a wider excision of this scalp area and complex closure of the excision defect. OPERATIVE PROCEDURE:  After appropriate consent was obtained, preoperative markings were placed. He was taken to the operating room and placed on the operative table in supine position. The table was rotated 90 degrees to the patient's right to provide exposure. 1% lidocaine with epinephrine x20 mL was injected in a wide area on the scalp.   A #15 scalpel blade was used to make a circular excision around the previous biopsy site with a healthy margin resulted in a circular excision defect of 3 cm. A stitch was placed at the 12 o'clock margin which would be the axis closest to the anterior aspect of the face, or closest to his brow. This was sent to pathology for permanent analysis. The Burow's triangles at the anterior 12 o'clock and the posterior 6 o'clock margin were also excised and discarded. This resulted in an excision defect 9 cm in length and 3 cm in width. Undermining was performed in the loose areolar plane on top of the periosteum for a distance of approximately 8 cm towards the apex of the left ear and approximately 6 cm across the midline of his scalp. Hemostasis was achieved with electrocautery. The wound was then closed in a complex fashion with 3-0 Vicryl suture and the galea aponeurotica and the dermis as an inverted deep dermal stitch. The superficial skin was then closed with a 3-0 nylon suture as an interlocking running suture. Xeroform gauze, 4 x 4 gauze and Kerlix rolls were placed. At the end of the procedure, sponge and needle counts were reported as correct. ESTIMATED BLOOD LOSS:  5 mL. IV FLUIDS:  400 mL. SPECIMENS REMOVED:  Melanoma in situ, scalp, sent to pathology permanent analysis. IMPLANTS:  None. DRAINS:  None. ASSISTANT:  Sam Mayers STAFF:  OR staff room 3 [facility]. COMPLICATIONS:  None. He was awakened, extubated, and transferred to the recovery room in satisfactory and stable condition. He will be discharged home today in the care of his family with prescriptions and instructions and will follow up with me within 2 weeks. Geannie Babinski, MD 
  
 
[address] Leann Zabala / JULIAN 
D: 09/26/2018 08:24 T: 09/26/2018 09:09 
JOB #: [id number] CC: Nelson Cardona MD

## 2018-09-26 NOTE — IP AVS SNAPSHOT
[address] [facility] [address] 
[phone] Patient: Keya Crowe MRN: [id number] XFA:7/77/1827 About your hospitalization You were admitted on:  September 26, 2018 You last received care in the:  [facility] ASU PACU You were discharged on:  September 26, 2018 Why you were hospitalized Your primary diagnosis was:  Not on File Your diagnoses also included:  Melanoma In Situ Of Scalp (Hcc) Follow-up Information Follow up With Details Comments Contact Info Jacob Martinez MD In 2 weeks  [address] 
[phone] Chandler Ragland MD   [place] [id number] [phone] Discharge Orders None A check dejuan indicates which time of day the medication should be taken. My Medications START taking these medications Instructions Each Dose to Equal  
 Morning Noon Evening Bedtime  
 cephALEXin 500 mg capsule Commonly known as:  Mika Swanson Your last dose was: Your next dose is: Take 1 Cap by mouth four (4) times daily for 5 days. 500 mg HYDROcodone-acetaminophen 5-325 mg per tablet Commonly known as:  Jay Chazster Your last dose was: Your next dose is: Take 1 Tab by mouth every four (4) hours as needed for Pain. Max Daily Amount: 6 Tabs. 1 Tab CONTINUE taking these medications Instructions Each Dose to Equal  
 Morning Noon Evening Bedtime COUMADIN 10 mg tablet Generic drug:  warfarin Your last dose was: Your next dose is: Take 10 mg by mouth daily. 10 mg  
    
   
   
   
  
 dutasteride 0.5 mg capsule Commonly known as:  AVODART Your last dose was: Your next dose is: Take 0.5 mg by mouth daily. 0.5 mg  
    
   
   
   
  
 fenofibrate 54 mg tablet Commonly known as:  LOFIBRA Your last dose was: Your next dose is: Take  by mouth daily. loratadine 10 mg tablet Commonly known as:  Cristiane Draft Your last dose was: Your next dose is: Take 10 mg by mouth daily. 10 mg  
    
   
   
   
  
 metFORMIN  mg tablet Commonly known as:  GLUCOPHAGE XR Your last dose was: Your next dose is: Take 750 mg by mouth two (2) times a day. 750 mg  
    
   
   
   
  
 tamsulosin 0.4 mg capsule Commonly known as:  FLOMAX Your last dose was: Your next dose is: Take 0.4 mg by mouth daily. 0.4 mg  
    
   
   
   
  
 VITAMIN B-12 1,000 mcg/mL injection Generic drug:  cyanocobalamin Your last dose was: Your next dose is:    
   
   
 1,000 mcg by IntraMUSCular route every seven (7) days. 1000 mcg Where to Get Your Medications Information on where to get these meds will be given to you by the nurse or doctor. ! Ask your nurse or doctor about these medications  
  cephALEXin 500 mg capsule HYDROcodone-acetaminophen 5-325 mg per tablet Opioid Education Prescription Opioids: What You Need to Know: 
 
Prescription opioids can be used to help relieve moderate-to-severe pain and are often prescribed following a surgery or injury, or for certain health conditions. These medications can be an important part of treatment but also come with serious risks. Opioids are strong pain medicines. Examples include hydrocodone, oxycodone, fentanyl, and morphine. Heroin is an example of an illegal opioid. It is important to work with your health care provider to make sure you are getting the safest, most effective care. WHAT ARE THE RISKS AND SIDE EFFECTS OF OPIOID USE?  
Prescription opioids carry serious risks of addiction and overdose, especially with prolonged use. An opioid overdose, often marked by slow breathing, can cause sudden death. The use of prescription opioids can have a number of side effects as well, even when taken as directed. · Tolerance-meaning you might need to take more of a medication for the same pain relief · Physical dependence-meaning you have symptoms of withdrawal when the medication is stopped. Withdrawal symptoms can include nausea, sweating, chills, diarrhea, stomach cramps, and muscle aches. Withdrawal can last up to several weeks, depending on which drug you took and how long you took it. · Increased sensitivity to pain · Constipation · Nausea, vomiting, and dry mouth · Sleepiness and dizziness · Confusion · Depression · Low levels of testosterone that can result in lower sex drive, energy, and strength · Itching and sweating RISKS ARE GREATER WITH:      
· History of drug misuse, substance use disorder, or overdose · Mental health conditions (such as depression or anxiety) · Sleep apnea · Older age (72 years or older) · Pregnancy Avoid alcohol while taking prescription opioids. Also, unless specifically advised by your health care provider, medications to avoid include: · Benzodiazepines (such as Xanax or Valium) · Muscle relaxants (such as Soma or Flexeril) · Hypnotics (such as Ambien or Lunesta) · Other prescription opioids KNOW YOUR OPTIONS Talk to your health care provider about ways to manage your pain that don't involve prescription opioids. Some of these options may actually work better and have fewer risks and side effects. Consult your physician before adding or stopping any medications, treatments, or physical activity. Options may include: 
· Pain relievers such as acetaminophen, ibuprofen, and naproxen · Some medications that are also used for depression or seizures · Physical therapy and exercise · Counseling to help patients learn how to cope better with triggers of pain and stress. · Application of heat or cold compress · Massage therapy · Relaxation techniques Be Informed Make sure you know the name of your medication, how much and how often to take it, and its potential risks & side effects. IF YOU ARE PRESCRIBED OPIOIDS FOR PAIN: 
· Never take opioids in greater amounts or more often than prescribed. Remember the goal is not to be pain-free but to manage your pain at a tolerable level. · Follow up with your primary care provider to: · Work together to create a plan on how to manage your pain. · Talk about ways to help manage your pain that don't involve prescription opioids. · Talk about any and all concerns and side effects. · Help prevent misuse and abuse. · Never sell or share prescription opioids · Help prevent misuse and abuse. · Store prescription opioids in a secure place and out of reach of others (this may include visitors, children, friends, and family). · Safely dispose of unused/unwanted prescription opioids: Find your community drug take-back program or your pharmacy mail-back program, or flush them down the toilet, following guidance from the Food and Drug Administration (www.fda.gov/Drugs/ResourcesForYou). · Visit www.cdc.gov/drugoverdose to learn about the risks of opioid abuse and overdose. · If you believe you may be struggling with addiction, tell your health care provider and ask for guidance or call [address] [place] [address] at 4-376-987-MGXF. Discharge Instructions Leave the compression garment on and dry for 48 hours, then may remove for shower. Resume any pre op medications or diet Keep head elevated at night at least 35 degrees, do not lay flat for about one week NO strenuous activity for one week When you remove the surgical dressing, shower like you normally would, place antibiotic ointment of choice over the incision with clean gauze, and may repeat daily for one week Call Dr Demond Villasenor at [phone] for follow up appointment in 2 weeks DISCHARGE SUMMARY from Nurse PATIENT INSTRUCTIONS: 
 
After general anesthesia or intravenous sedation, for 24 hours or while taking prescription Narcotics: · Limit your activities · Do not drive and operate hazardous machinery · Do not make important personal or business decisions · Do  not drink alcoholic beverages · If you have not urinated within 8 hours after discharge, please contact your surgeon on call. Report the following to your surgeon: 
· Excessive pain, swelling, redness or odor of or around the surgical area · Temperature over 100.5 · Nausea and vomiting lasting longer than 4 hours or if unable to take medications · Any signs of decreased circulation or nerve impairment to extremity: change in color, persistent  numbness, tingling, coldness or increase pain · Any questions What to do at Home: *  Please give a list of your current medications to your Primary Care Provider. *  Please update this list whenever your medications are discontinued, doses are 
    changed, or new medications (including over-the-counter products) are added. *  Please carry medication information at all times in case of emergency situations. These are general instructions for a healthy lifestyle: No smoking/ No tobacco products/ Avoid exposure to second hand smoke Surgeon General's Warning:  Quitting smoking now greatly reduces serious risk to your health. Obesity, smoking, and sedentary lifestyle greatly increases your risk for illness A healthy diet, regular physical exercise & weight monitoring are important for maintaining a healthy lifestyle You may be retaining fluid if you have a history of heart failure or if you experience any of the following symptoms:  Weight gain of 3 pounds or more overnight or 5 pounds in a week, increased swelling in our hands or feet or shortness of breath while lying flat in bed. Please call your doctor as soon as you notice any of these symptoms; do not wait until your next office visit. Recognize signs and symptoms of STROKE: 
 
F-face looks uneven A-arms unable to move or move unevenly S-speech slurred or non-existent T-time-call 911 as soon as signs and symptoms begin-DO NOT go Back to bed or wait to see if you get better-TIME IS BRAIN. Warning Signs of HEART ATTACK Call 911 if you have these symptoms: 
? Chest discomfort. Most heart attacks involve discomfort in the center of the chest that lasts more than a few minutes, or that goes away and comes back. It can feel like uncomfortable pressure, squeezing, fullness, or pain. ? Discomfort in other areas of the upper body. Symptoms can include pain or discomfort in one or both arms, the back, neck, jaw, or stomach. ? Shortness of breath with or without chest discomfort. ? Other signs may include breaking out in a cold sweat, nausea, or lightheadedness. Don't wait more than five minutes to call [address]! Fast action can save your life. Calling 911 is almost always the fastest way to get lifesaving treatment. Emergency Medical Services staff can begin treatment when they arrive  up to an hour sooner than if someone gets to the hospital by car. The discharge information has been reviewed with the patient. The patient verbalized understanding. Discharge medications reviewed with the patient and appropriate educational materials and side effects teaching were provided. ___________________________________________________________________________________________________________________________________ Introducing [facility]!    
 [facility] introduces "[employer]" patient portal. Now you can access parts of your medical record, email your doctor's office, and request medication refills online. 1. In your internet browser, go to [email or web address] 2. Click on the First Time User? Click Here link in the Sign In box. You will see the New Member Sign Up page. 3. Enter your [employer] Access Code exactly as it appears below. You will not need to use this code after youve completed the sign-up process. If you do not sign up before the expiration date, you must request a new code. · [employer] Access Code: [email or web address] Expires: 12/11/2018  7:57 AM 
 
4. Enter the last four digits of your Social Security Number (xxxx) and Date of Birth (mm/dd/yyyy) as indicated and click Submit. You will be taken to the next sign-up page. 5. Create a [employer] Eight ID. This will be your [employer] login ID and cannot be changed, so think of one that is secure and easy to remember. 6. Create a [employer] password. You can change your password at any time. 7. Enter your Password Reset Question and Answer. This can be used at a later time if you forget your password. 8. Enter your e-mail address. You will receive e-mail notification when new information is available in [address] 9. Click Sign Up. You can now view and download portions of your medical record. 10. Click the Download Summary menu link to download a portable copy of your medical information. If you have questions, please visit the Frequently Asked Questions section of the [employer] website. Remember, [employer] is NOT to be used for urgent needs. For medical emergencies, dial 911. Now available from your iPhone and Android! Introducing Frederick Meyer As a [employer] patient, I wanted to make you aware of our electronic visit tool called Frederick Meyer. [employer] 24/7 allows you to connect within minutes with a medical provider 24 hours a day, seven days a week via a mobile device or tablet or logging into a secure website from your computer. You can access [email or web address] from anywhere in the [place]. A virtual visit might be right for you when you have a simple condition and feel like you just dont want to get out of bed, or cant get away from work for an appointment, when your regular [employer] provider is not available (evenings, weekends or holidays), or when youre out of town and need minor care. Electronic visits cost only $49 and if the [employer] 24/7 provider determines a prescription is needed to treat your condition, one can be electronically transmitted to a nearby pharmacy*. Please take a moment to enroll today if you have not already done so. The enrollment process is free and takes just a few minutes. To enroll, please download the [employer] 24/7 kar to your tablet or phone, or visit [email or web address] to enroll on your computer. And, as an [address] [place] [address] patient with a [employer] account, the results of your visits will be scanned into your electronic medical record and your primary care provider will be able to view the scanned results. We urge you to continue to see your regular [employer] provider for your ongoing medical care. And while your primary care provider may not be the one available when you seek a [email or web address] virtual visit, the peace of mind you get from getting a real diagnosis real time can be priceless. For more information on [email or web address], view our Frequently Asked Questions (FAQs) at [email or web address]. Sincerely, 
 
Alfonzo Noble MD 
Chief Medical Officer Barron Sparrow *:  certain medications cannot be prescribed via [employer] Providers Seen During Your Hospitalization Provider Specialty Primary office phone Adebayo Perez [address] Surgery [phone] Your Primary Care Physician (PCP) Primary Care Physician Office Phone Office Fax Astrid Pineda [phone] You are allergic to the following No active allergies Recent Documentation Height Weight BMI Smoking Status 1.854 m 101.2 kg 29.42 kg/m2 Former Smoker Emergency Contacts Name Discharge Info Relation Home Work Mobile Dina Doug DISCHARGE CAREGIVER [3] Son [22] [phone] Bennie Little DISCHARGE CAREGIVER [3] Other Relative [6] [phone] Patient Belongings The following personal items are in your possession at time of discharge: 
  Dental Appliances: Partials                Clothing:  (in locker) Please provide this summary of care documentation to your next provider. Signatures-by signing, you are acknowledging that this After Visit Summary has been reviewed with you and you have received a copy. Patient Signature:  ____________________________________________________________ Date:  ____________________________________________________________  
  
Sarah Sleight Provider Signature:  ____________________________________________________________ Date:  ____________________________________________________________

## 2018-09-26 NOTE — ANESTHESIA PREPROCEDURE EVALUATION
Anesthetic History No history of anesthetic complications Review of Systems / Medical History Patient summary reviewed, nursing notes reviewed and pertinent labs reviewed Pulmonary Within defined limits Neuro/Psych Within defined limits Cardiovascular Dysrhythmias GI/Hepatic/Renal 
  
 
 
 
PUD Endo/Other Diabetes Arthritis Other Findings Physical Exam 
 
Airway Mallampati: II 
TM Distance: > 6 cm Neck ROM: normal range of motion Mouth opening: Normal 
 
 Cardiovascular Regular rate and rhythm,  S1 and S2 normal,  no murmur, click, rub, or gallop Dental 
No notable dental hx Pulmonary Breath sounds clear to auscultation Abdominal 
GI exam deferred Other Findings Anesthetic Plan ASA: 3 Anesthesia type: MAC Induction: Intravenous Anesthetic plan and risks discussed with: Patient

## 2018-09-26 NOTE — INTERVAL H&P NOTE
H&P Update: 
Ema Valle Dina Garcia was seen and examined. History and physical has been reviewed. The patient has been examined.  There have been no significant clinical changes since the completion of the originally dated History and Physical. 
 
Signed By: Phillip Pacheco MD   
 September 26, 2018 6:08 AM

## 2018-09-26 NOTE — ANESTHESIA POSTPROCEDURE EVALUATION
Post-Anesthesia Evaluation and Assessment Patient: Milly Parr. MRN: [id number]  SSN: [id number] YOB: 1940  Age: [de-identified] y.o. Sex: male Cardiovascular Function/Vital Signs Visit Vitals  /60  Pulse 70  Temp 36.4 °C (97.5 °F)  Resp 20  
 Ht 6' 1\" (1.854 m)  Wt 101.2 kg (223 lb)  SpO2 96%  BMI 29.42 kg/m2 Patient is status post MAC anesthesia for Procedure(s): WIDE LOCAL EXCISION MELANOMA IN SITU LEFT SCALP. Nausea/Vomiting: None Postoperative hydration reviewed and adequate. Pain: 
Pain Scale 1: Numeric (0 - 10) (09/26/18 0830) Pain Intensity 1: 0 (09/26/18 0830) Managed Neurological Status:  
Neuro (WDL): Within Defined Limits (09/26/18 0830) Neuro Neurologic State: Alert (09/26/18 0830) LUE Motor Response: Purposeful (09/26/18 0830) LLE Motor Response: Purposeful (09/26/18 0830) RUE Motor Response: Purposeful (09/26/18 0830) RLE Motor Response: Purposeful (09/26/18 0830) At baseline Mental Status and Level of Consciousness: Arousable Pulmonary Status:  
O2 Device: Room air (09/26/18 0858) Adequate oxygenation and airway patent Complications related to anesthesia: None Post-anesthesia assessment completed. No concerns Signed By: Radha Clark MD   
 September 26, 2018

## 2018-09-26 NOTE — ROUTINE PROCESS
Patient: Mary Ann Gomez MRN: [id number]  SSN: [id number] YOB: 1940  Age: [de-identified] y.o. Sex: male Patient is status post Procedure(s): WIDE LOCAL EXCISION MELANOMA IN SITU LEFT SCALP. Surgeon(s) and Role: Sal Jones MD - Primary Local/Dose/Irrigation:  See med rec Peripheral IV 09/26/18 Right Hand (Active) Dressing/Packing:  Wound Head-DRESSING TYPE: 4 x 4;Gauze wrap (laney); Special tape (comment); Xeroform (09/26/18 0749) Splint/Cast:  ] Other:

## 2018-09-26 NOTE — BRIEF OP NOTE
BRIEF OPERATIVE NOTE Date of Procedure: 9/26/2018 Preoperative Diagnosis: MELANOMA Postoperative Diagnosis: MELANOMA Procedure(s): WIDE LOCAL EXCISION MELANOMA IN SITU LEFT SCALP Surgeon(s) and Role: Cayla Ramirez MD - Primary Surgical Assistant: Michael/Ani/Diane Surgical Staff: 
Circ-1: Dorinda Yusuf RN Scrub Tech-2: Trish Reeves Scrub RN-1: Dina Yepez RN Event Time In Incision Start 0740 Incision Close 0807 Anesthesia: MAC Estimated Blood Loss: 5 Specimens:  
ID Type Source Tests Collected by Time Destination 1 : melanoma in situ left scalp Fresh Scalp  Wu Hernandez MD 9/26/2018 07 Pathology Findings: scar and adherence of scalp Complications: none Implants: * No implants in log *

## 2018-09-28 NOTE — OP NOTES
[address] [place] [address] REPORT    Name:John BHARDWAJ SR..  MR#: [id number]  : 1940  ACCOUNT #: [de-identified]   DATE OF SERVICE: 2018    PREOPERATIVE DIAGNOSIS:  Residual melanoma in situ, scalp, left apical scalp approximately 2.5 x 2.5 cm. POSTOPERATIVE DIAGNOSIS:  Residual melanoma in situ, scalp, left apical scalp approximately 2.5 x 2.5 cm. PROCEDURE PERFORMED:  Wide local excision melanoma in situ, scalp 3 cm and complex closure to defect on scalp 9 x 3 cm. SURGEON:  Shashank Barrera MD    ANESTHESIA:  IV sedation with local anesthesia. INDICATION FOR PROCEDURE:  The patient is a pleasant 78-year-old male who has been seen in the office for multiple attempts at excision of a melanoma in situ from his left apical scalp. He was initially referred by his dermatologist after shave biopsy revealed melanoma in situ. He has had history of previous melanoma and nonmelanoma skin cancers. An attempt in the office setting under local anesthesia was made to excise this as the shave defect was approximately 6-7 mm in size. Multiple attempts at wide local excision with at least a 5 mm reexcision margin have demonstrated positive margins. Because it appears as though there is a greater degree of extension of melanoma in situ that is not visible in the soft tissues, he is brought to the operating room today to facilitate a wider excision of this scalp area and complex closure of the excision defect. OPERATIVE PROCEDURE:  After appropriate consent was obtained, preoperative markings were placed. He was taken to the operating room and placed on the operative table in supine position. The table was rotated 90 degrees to the patient's right to provide exposure. 1% lidocaine with epinephrine x20 mL was injected in a wide area on the scalp.   A #15 scalpel blade was used to make a circular excision around the previous biopsy site with a healthy margin resulted in a circular excision defect of 3 cm. A stitch was placed at the 12 o'clock margin which would be the axis closest to the anterior aspect of the face, or closest to his brow. This was sent to pathology for permanent analysis. The Burow's triangles at the anterior 12 o'clock and the posterior 6 o'clock margin were also excised and discarded. This resulted in an excision defect 9 cm in length and 3 cm in width. Undermining was performed in the loose areolar plane on top of the periosteum for a distance of approximately 8 cm towards the apex of the left ear and approximately 6 cm across the midline of his scalp. Hemostasis was achieved with electrocautery. The wound was then closed in a complex fashion with 3-0 Vicryl suture and the galea aponeurotica and the dermis as an inverted deep dermal stitch. The superficial skin was then closed with a 3-0 nylon suture as an interlocking running suture. Xeroform gauze, 4 x 4 gauze and Kerlix rolls were placed. At the end of the procedure, sponge and needle counts were reported as correct. ESTIMATED BLOOD LOSS:  5 mL. IV FLUIDS:  400 mL. SPECIMENS REMOVED:  Melanoma in situ, scalp, sent to pathology permanent analysis. IMPLANTS:  None. DRAINS:  None. ASSISTANT:  Monroe Cardenas     STAFF:  OR staff room 3 [place] seventh floor. COMPLICATIONS:  None. He was awakened, extubated, and transferred to the recovery room in satisfactory and stable condition. He will be discharged home today in the care of his family with prescriptions and instructions and will follow up with me within 2 weeks.       Kike Rivers MD       [address] / Eric Martinez  D: 09/26/2018 08:24     T: 09/26/2018 09:09  JOB #: [id number]  CC: Tami Gutierrez MD

## 2018-10-04 ENCOUNTER — ANESTHESIA (OUTPATIENT)
Dept: ENDOSCOPY | Age: 78
End: 2018-10-04
Payer: MEDICARE

## 2018-10-04 ENCOUNTER — ANESTHESIA EVENT (OUTPATIENT)
Dept: ENDOSCOPY | Age: 78
End: 2018-10-04
Payer: MEDICARE

## 2018-10-04 ENCOUNTER — HOSPITAL ENCOUNTER (OUTPATIENT)
Age: 78
Setting detail: OUTPATIENT SURGERY
Discharge: HOME | End: 2018-10-04
Attending: INTERNAL MEDICINE | Admitting: INTERNAL MEDICINE
Payer: MEDICARE

## 2018-10-04 VITALS
TEMPERATURE: 97.7 F | BODY MASS INDEX: 29.55 KG/M2 | DIASTOLIC BLOOD PRESSURE: 58 MMHG | HEART RATE: 61 BPM | WEIGHT: 223 LBS | HEIGHT: 73 IN | RESPIRATION RATE: 21 BRPM | SYSTOLIC BLOOD PRESSURE: 111 MMHG | OXYGEN SATURATION: 100 %

## 2018-10-04 LAB
GLUCOSE BLD STRIP.AUTO-MCNC: 108 MG/DL (ref 65–100)
H PYLORI FROM TISSUE: NEGATIVE
KIT LOT NO., HCLOLOT: NORMAL
NEGATIVE CONTROL: NEGATIVE
POSITIVE CONTROL: POSITIVE
SERVICE CMNT-IMP: ABNORMAL

## 2018-10-04 PROCEDURE — 87077 CULTURE AEROBIC IDENTIFY: CPT | Performed by: INTERNAL MEDICINE

## 2018-10-04 PROCEDURE — 76060000031 HC ANESTHESIA FIRST 0.5 HR: Performed by: INTERNAL MEDICINE

## 2018-10-04 PROCEDURE — 82962 GLUCOSE BLOOD TEST: CPT

## 2018-10-04 PROCEDURE — 77030009426 HC FCPS BIOP ENDOSC BSC -B: Performed by: INTERNAL MEDICINE

## 2018-10-04 PROCEDURE — 76040000019: Performed by: INTERNAL MEDICINE

## 2018-10-04 PROCEDURE — 74011250636 HC RX REV CODE- 250/636

## 2018-10-04 PROCEDURE — 74011250636 HC RX REV CODE- 250/636: Performed by: INTERNAL MEDICINE

## 2018-10-04 RX ORDER — PROPOFOL 10 MG/ML
INJECTION, EMULSION INTRAVENOUS AS NEEDED
Status: DISCONTINUED | OUTPATIENT
Start: 2018-10-04 | End: 2018-10-04 | Stop reason: HOSPADM

## 2018-10-04 RX ORDER — ATROPINE SULFATE 0.1 MG/ML
0.4 INJECTION INTRAVENOUS
Status: DISCONTINUED | OUTPATIENT
Start: 2018-10-04 | End: 2018-10-04 | Stop reason: HOSPADM

## 2018-10-04 RX ORDER — SODIUM CHLORIDE 9 MG/ML
INJECTION, SOLUTION INTRAVENOUS
Status: DISCONTINUED | OUTPATIENT
Start: 2018-10-04 | End: 2018-10-04 | Stop reason: HOSPADM

## 2018-10-04 RX ORDER — SODIUM CHLORIDE 9 MG/ML
50 INJECTION, SOLUTION INTRAVENOUS CONTINUOUS
Status: DISCONTINUED | OUTPATIENT
Start: 2018-10-04 | End: 2018-10-04 | Stop reason: HOSPADM

## 2018-10-04 RX ORDER — DEXTROMETHORPHAN/PSEUDOEPHED 2.5-7.5/.8
1.2 DROPS ORAL
Status: DISCONTINUED | OUTPATIENT
Start: 2018-10-04 | End: 2018-10-04 | Stop reason: HOSPADM

## 2018-10-04 RX ORDER — EPINEPHRINE 0.1 MG/ML
1 INJECTION INTRACARDIAC; INTRAVENOUS
Status: DISCONTINUED | OUTPATIENT
Start: 2018-10-04 | End: 2018-10-04 | Stop reason: HOSPADM

## 2018-10-04 RX ORDER — LIDOCAINE HYDROCHLORIDE 20 MG/ML
INJECTION, SOLUTION EPIDURAL; INFILTRATION; INTRACAUDAL; PERINEURAL AS NEEDED
Status: DISCONTINUED | OUTPATIENT
Start: 2018-10-04 | End: 2018-10-04 | Stop reason: HOSPADM

## 2018-10-04 RX ORDER — MIDAZOLAM HYDROCHLORIDE 1 MG/ML
.25-5 INJECTION, SOLUTION INTRAMUSCULAR; INTRAVENOUS
Status: DISCONTINUED | OUTPATIENT
Start: 2018-10-04 | End: 2018-10-04 | Stop reason: HOSPADM

## 2018-10-04 RX ORDER — FLUMAZENIL 0.1 MG/ML
0.2 INJECTION INTRAVENOUS
Status: DISCONTINUED | OUTPATIENT
Start: 2018-10-04 | End: 2018-10-04 | Stop reason: HOSPADM

## 2018-10-04 RX ORDER — NALOXONE HYDROCHLORIDE 0.4 MG/ML
0.4 INJECTION, SOLUTION INTRAMUSCULAR; INTRAVENOUS; SUBCUTANEOUS
Status: DISCONTINUED | OUTPATIENT
Start: 2018-10-04 | End: 2018-10-04 | Stop reason: HOSPADM

## 2018-10-04 RX ADMIN — PROPOFOL 30 MG: 10 INJECTION, EMULSION INTRAVENOUS at 10:59

## 2018-10-04 RX ADMIN — SODIUM CHLORIDE 50 ML/HR: 900 INJECTION, SOLUTION INTRAVENOUS at 10:34

## 2018-10-04 RX ADMIN — PROPOFOL 20 MG: 10 INJECTION, EMULSION INTRAVENOUS at 10:57

## 2018-10-04 RX ADMIN — PROPOFOL 80 MG: 10 INJECTION, EMULSION INTRAVENOUS at 10:56

## 2018-10-04 RX ADMIN — LIDOCAINE HYDROCHLORIDE 50 MG: 20 INJECTION, SOLUTION EPIDURAL; INFILTRATION; INTRACAUDAL; PERINEURAL at 10:54

## 2018-10-04 RX ADMIN — SODIUM CHLORIDE: 9 INJECTION, SOLUTION INTRAVENOUS at 10:35

## 2018-10-04 RX ADMIN — PROPOFOL 30 MG: 10 INJECTION, EMULSION INTRAVENOUS at 10:58

## 2018-10-04 NOTE — ROUTINE PROCESS
Kassy Alejo . 
1940 
[id number] Situation: 
Verbal report received from: Veronica Phipps Procedure: Procedure(s): ESOPHAGOGASTRODUODENOSCOPY (EGD) ESOPHAGOGASTRODUODENAL (EGD) BIOPSY Background: 
 
Preoperative diagnosis: REFLUX Postoperative diagnosis: Normal EGD :  Dr. Bennett (s): Endoscopy Technician-1: Mackenzie Up Endoscopy RN-1: Val Espinoza RN Specimens: * No specimens in log * H. Pylori  yes Assessment: 
Intra-procedure medications Anesthesia gave intra-procedure sedation and medications, see anesthesia flow sheet yes Intravenous fluids: NS@ Rinda Blocker Vital signs stable Abdominal assessment: round and soft Recommendation: 
Discharge patient per MD order. Return to floor Family or Friend Permission to share finding with family or friend yes

## 2018-10-04 NOTE — DISCHARGE INSTRUCTIONS
Ena Medina M.D.  [phone]           10/4/2018  Bret Martínezmarisol .  :  1940  [facility] Medical Record Number:  [id number]        ENDOSCOPY FINDINGS:   Your endoscopy showed complete healing of the ulcers and mucosa looked within normal throughout. EGD DISCHARGE INSTRUCTIONS    DISCOMFORT:  Sore throat- throat lozenges or warm salt water gargle  redness at IV site- apply warm compress to area; if redness or soreness persist- contact your physician  Gaseous discomfort- walking, belching will help relieve any discomfort  You may not operate a vehicle for 12 hours  You may not engage in an occupation involving machinery or appliances for rest of today  You may not drink alcoholic beverages for at least 12 hours  Avoid making any critical decisions for at least 24 hour    DIET:   You may resume your regular diet. ACTIVITY  Spend the remainder of the day resting -  avoid any strenuous activity. Avoid driving or operating machinery. CALL M.D. ANY SIGN OF   Increasing pain, nausea, vomiting  Abdominal distension (swelling)  New increased bleeding (oral or rectal)  Fever (chills)  Pain in chest area  Bloody discharge from nose or mouth  Shortness of breath    Follow-up Instructions:   Call Dr. Nata Gracia for any questions or problems. Telephone # [phone]  Biopsies were obtained to check and make sure the H.pylori bacteria has been eradicated. Continue same medications. Follow up in the office as needed.

## 2018-10-04 NOTE — IP AVS SNAPSHOT
[facility] 
 
 
 [address] [facility] 
[phone] Patient: Lu Riggs. MRN: [id number] MTN:[id number] About your hospitalization You were admitted on:  October 4, 2018 You last received care in the:  [facility] ENDOSCOPY You were discharged on:  October 4, 2018 Why you were hospitalized Your primary diagnosis was:  Not on File Follow-up Information None Discharge Orders None A check dejuan indicates which time of day the medication should be taken. My Medications CONTINUE taking these medications Instructions Each Dose to Equal  
 Morning Noon Evening Bedtime COUMADIN 10 mg tablet Generic drug:  warfarin Your last dose was: Your next dose is: Take 10 mg by mouth daily. 10 mg  
    
   
   
   
  
 dutasteride 0.5 mg capsule Commonly known as:  AVODART Your last dose was: Your next dose is: Take 0.5 mg by mouth daily. 0.5 mg  
    
   
   
   
  
 fenofibrate 54 mg tablet Commonly known as:  LOFIBRA Your last dose was: Your next dose is: Take  by mouth daily. HYDROcodone-acetaminophen 5-325 mg per tablet Commonly known as:  Fawad Bail Your last dose was: Your next dose is: Take 1 Tab by mouth every four (4) hours as needed for Pain. Max Daily Amount: 6 Tabs. 1 Tab  
    
   
   
   
  
 loratadine 10 mg tablet Commonly known as:  Imani Baltazar Your last dose was: Your next dose is: Take 10 mg by mouth daily. 10 mg  
    
   
   
   
  
 metFORMIN  mg tablet Commonly known as:  GLUCOPHAGE XR Your last dose was: Your next dose is: Take 750 mg by mouth two (2) times a day. 750 mg  
    
   
   
   
  
 tamsulosin 0.4 mg capsule Commonly known as:  FLOMAX Your last dose was: Your next dose is: Take 0.4 mg by mouth daily. 0.4 mg  
    
   
   
   
  
 VITAMIN B-12 1,000 mcg/mL injection Generic drug:  cyanocobalamin Your last dose was: Your next dose is:    
   
   
 1,000 mcg by IntraMUSCular route every seven (7) days. 1000 mcg Opioid Education Prescription Opioids: What You Need to Know: 
 
        
10/4/2018 Mehul Deras Sr. 
:  1940 [address] [place] [address] Record Number:  [id number] ENDOSCOPY FINDINGS: 
 Your endoscopy showed complete healing of the ulcers and mucosa looked within normal throughout. EGD DISCHARGE INSTRUCTIONS DISCOMFORT: 
Sore throat- throat lozenges or warm salt water gargle 
redness at IV site- apply warm compress to area; if redness or soreness persist- contact your physician Gaseous discomfort- walking, belching will help relieve any discomfort You may not operate a vehicle for 12 hours You may not engage in an occupation involving machinery or appliances for rest of today You may not drink alcoholic beverages for at least 12 hours Avoid making any critical decisions for at least 24 hour DIET: 
 You may resume your regular diet. ACTIVITY Spend the remainder of the day resting -  avoid any strenuous activity. Avoid driving or operating machinery. CALL M.D. ANY SIGN OF Increasing pain, nausea, vomiting Abdominal distension (swelling) New increased bleeding (oral or rectal) Fever (chills) Pain in chest area Bloody discharge from nose or mouth Shortness of breath Follow-up Instructions: 
 Call Dr. Roland Cortes for any questions or problems. Telephone # [phone] Biopsies were obtained to check and make sure the H.pylori bacteria has been eradicated. Continue same medications. Follow up in the office as needed. Introducing [facility]! [facility] introduces [employer] patient portal. Now you can access parts of your medical record, email your doctor's office, and request medication refills online. 1. In your internet browser, go to [email or web address] 2. Click on the First Time User? Click Here link in the Sign In box. You will see the New Member Sign Up page. 3. Enter your [employer] Access Code exactly as it appears below. You will not need to use this code after youve completed the sign-up process. If you do not sign up before the expiration date, you must request a new code. · [employer] Access Code: [email or web address] Expires: 12/11/2018  7:57 AM 
 
4. Enter the last four digits of your Social Security Number (xxxx) and Date of Birth (mm/dd/yyyy) as indicated and click Submit. You will be taken to the next sign-up page. 5. Create a [employer] ID. This will be your [employer] login ID and cannot be changed, so think of one that is secure and easy to remember. 6. Create a [employer] password. You can change your password at any time. 7. Enter your Password Reset Question and Answer. This can be used at a later time if you forget your password. 8. Enter your e-mail address. You will receive e-mail notification when new information is available in [address] 9. Click Sign Up. You can now view and download portions of your medical record. 10. Click the Download Summary menu link to download a portable copy of your medical information. If you have questions, please visit the Frequently Asked Questions section of the [employer] website.  Remember, [employer] is NOT to be used for urgent needs. For medical emergencies, dial 911. Now available from your iPhone and Android! Introducing Frederick Meyer As a [employer] patient, I wanted to make you aware of our electronic visit tool called [employer]. [employer] 24/7 allows you to connect within minutes with a medical provider 24 hours a day, seven days a week via a mobile device or tablet or logging into a secure website from your computer. You can access Frederick Meyer from anywhere in the [place]. A virtual visit might be right for you when you have a simple condition and feel like you just dont want to get out of bed, or cant get away from work for an appointment, when your regular [employer] provider is not available (evenings, weekends or holidays), or when youre out of town and need minor care. Electronic visits cost only $49 and if the [employer] 24/7 provider determines a prescription is needed to treat your condition, one can be electronically transmitted to a nearby pharmacy*. Please take a moment to enroll today if you have not already done so. The enrollment process is free and takes just a few minutes. To enroll, please download the [employer] 24/7 kar to your tablet or phone, or visit [email or web address] to enroll on your computer. And, as an [address] [place] [address] patient with a [employer] account, the results of your visits will be scanned into your electronic medical record and your primary care provider will be able to view the scanned results. We urge you to continue to see your regular [employer] provider for your ongoing medical care. And while your primary care provider may not be the one available when you seek a [employer] Meyer virtual visit, the peace of mind you get from getting a real diagnosis real time can be priceless. For more information on Frederick Meyer, view our Frequently Asked Questions (FAQs) at [email or web address].  
 
Sincerely, 
 
 Arnie Garcia MD 
Chief Medical Officer [employer] *:  certain medications cannot be prescribed via [employer] Providers Seen During Your Hospitalization Provider Specialty Primary office phone Jim Faustin MD Gastroenterology [phone] Your Primary Care Physician (PCP) Primary Care Physician Office Phone Office Fax Renay Rodriguez [phone] You are allergic to the following No active allergies Recent Documentation Height Weight BMI Smoking Status 1.854 m 101.2 kg 29.42 kg/m2 Former Smoker Emergency Contacts Name Discharge Info Relation Home Work Mobile Anna 12 CAREGIVER [3] Daughter [21]   [phone] DinaDoug galeano Jr DISCHARGE CAREGIVER [3] Son [22] [phone] Yvette Littlee DISCHARGE CAREGIVER [3] Other Relative [6] [phone] Patient Belongings The following personal items are in your possession at time of discharge: 
  Dental Appliances: Uppers, Partials, With patient  Visual Aid: Glasses, With patient   Hearing Aids/Status: With patient, Functioning, Left, Right Please provide this summary of care documentation to your next provider. Signatures-by signing, you are acknowledging that this After Visit Summary has been reviewed with you and you have received a copy. Patient Signature:  ____________________________________________________________ Date:  ____________________________________________________________  
  
Orlean Amend Provider Signature:  ____________________________________________________________ Date:  ____________________________________________________________

## 2018-10-04 NOTE — ANESTHESIA PREPROCEDURE EVALUATION
Anesthetic History No history of anesthetic complications Review of Systems / Medical History Patient summary reviewed Pulmonary Within defined limits Neuro/Psych Within defined limits Cardiovascular Exercise tolerance: >4 METS Comments: Hx of DVT & PE  
GI/Hepatic/Renal 
  
 
 
 
PUD Endo/Other Diabetes: type 2 Other Findings Physical Exam 
 
Airway Mallampati: II 
TM Distance: 4 - 6 cm Neck ROM: normal range of motion Mouth opening: Normal 
 
 Cardiovascular Rhythm: regular Rate: normal 
 
 
 
 Dental 
No notable dental hx Pulmonary Breath sounds clear to auscultation Abdominal 
 
 
 
 Other Findings Anesthetic Plan ASA: 3 Anesthesia type: MAC Anesthetic plan and risks discussed with: Patient and Son / Daughter

## 2018-10-04 NOTE — PROCEDURES
Geraldo Clancy M.D.  [phone]           10/4/2018                EGD Operative Report  Sergio Jackson Sr.  :  1940  [facility] Medical Record Number:  [id number]      Indication:  History of gastric and duodenal ulcers with bleeding on coumadin, question of Alarcon's esophagus     : Sadie George MD    Referring Provider:  Harper Aguila MD      Anesthesia/Sedation:  MAC anesthesia    Airway assessment: No airway problems anticipated    Pre-Procedural Exam:      Airway: clear, no airway problems anticipated  Heart: RRR, without gallops or rubs  Lungs: clear bilaterally without wheezes, crackles, or rhonchi  Abdomen: soft, nontender, nondistended, bowel sounds present  Mental Status: awake, alert and oriented to person, place and time       Procedure Details     After infomed consent was obtained for the procedure, with all risks and benefits of procedure explained the patient was taken to the endoscopy suite and placed in the left lateral decubitus position. Following sequential administration of sedation as per above, the endoscope was inserted into the mouth and advanced under direct vision to second portion of the duodenum. A careful inspection was made as the gastroscope was withdrawn, including a retroflexed view of the proximal stomach; findings and interventions are described below. Findings:   Esophagus:Mucosa within normal throughout the esophagus, mildly irregular Z-line, no evidence of salmon colored mucosa suggestive of Alarcon's esophagus seen. Stomach: normal, previously seen ulcers have completely healed. Duodenum/jejunum: normal, previously seen ulcer has completely healed    Therapies:  none    Specimens: Pyloritek           Complications:   None; patient tolerated the procedure well. EBL:  None. Impression:    Upper endoscopy within normal, previously seen ulcers have completely healed.     Recommendations:    -Continue acid suppression.  -Await ALISSA test result and retreat for Helicobacter pylori if positive.  -Follow-up in the office as needed    Eduardo Calixto MD

## 2018-10-04 NOTE — H&P
[address] Zuhair Rawls M.D. 
[phone] History and Physical    
 
NAME:  Tucker Calderon Sr.  
:   1940 MRN:   [id number] Referring Physician:  Dr. Brenda Loving Consult Date: 10/4/2018 10:47 AM 
 
Chief Complaint:  History of gastric and duodenal ulcers History of Present Illness:  Patient is a [de-identified] y.o. who is seen for follow-up EGD with recent gastric and duodenal ulcers and probable Alarcon's esophagus. Denies any ongoing GI complaints. PMH: 
Past Medical History:  
Diagnosis Date  Arrhythmia MURMUR  Arthritis  Benign prostate hyperplasia  Cancer ([address] 13 MELANOMA  Diabetes ([address]  Edema, lower extremity  [place] (hard of hearing)  Ill-defined condition MAG. DEGEN. L EYE  Malignant melanoma ([address] 2018 SCALP  
 PUD (peptic ulcer disease)  Rectal bleeding  Skin cancer 2018 MELANOMA SCALP  Thromboembolus ([address] L LEG X2  
 
 
PSH: 
Past Surgical History:  
Procedure Laterality Date  HX CATARACT REMOVAL Bilateral   
 HX COLONOSCOPY    
 HX GI    
 COLONOSCOPY  
 HX HEENT    
 MELANOMA  REMOVED FROM SCALP X3  
 HX HEENT  2018 MELANOMA IN SITU LEFT SCALP   
 HX HERNIA REPAIR Bilateral 2016 INGUINAL  
 HX OTHER SURGICAL    
 MELANOMA EXCISION SCALP  
 HX UROLOGICAL    
 TURP  
 NEUROLOGICAL PROCEDURE UNLISTED    
 NECK SURGERY  VASCULAR SURGERY PROCEDURE UNLIST R LEG VEIN STRIPPING  VASCULAR SURGERY PROCEDURE UNLIST    
 MANASA FILTER Allergies: 
No Known Allergies Home Medications: 
Prior to Admission Medications Prescriptions Last Dose Informant Patient Reported? Taking? HYDROcodone-acetaminophen (NORCO) 5-325 mg per tablet 2018 at Unknown time  No Yes Sig: Take 1 Tab by mouth every four (4) hours as needed for Pain. Max Daily Amount: 6 Tabs.   
cyanocobalamin (VITAMIN B-12) 1,000 mcg/mL injection 2018  Yes No  
 Si,000 mcg by IntraMUSCular route every seven (7) days. dutasteride (AVODART) 0.5 mg capsule 10/3/2018 at Unknown time  Yes Yes Sig: Take 0.5 mg by mouth daily. fenofibrate (LOFIBRA) 54 mg tablet 10/3/2018 at pm  Yes Yes Sig: Take  by mouth daily. loratadine (CLARITIN) 10 mg tablet 10/3/2018 at pm  Yes Yes Sig: Take 10 mg by mouth daily. metformin ER (GLUCOPHAGE XR) 750 mg tablet 10/3/2018 at pm  Yes Yes Sig: Take 750 mg by mouth two (2) times a day. tamsulosin (FLOMAX) 0.4 mg capsule 10/3/2018 at Unknown time  Yes Yes Sig: Take 0.4 mg by mouth daily. warfarin (COUMADIN) 10 mg tablet 2018  Yes No  
Sig: Take 10 mg by mouth daily. Facility-Administered Medications: None Hospital Medications: 
Current Facility-Administered Medications Medication Dose Route Frequency  0.9% sodium chloride infusion  50 mL/hr IntraVENous CONTINUOUS  
 midazolam (VERSED) injection 0.25-5 mg  0.25-5 mg IntraVENous Multiple  naloxone (NARCAN) injection 0.4 mg  0.4 mg IntraVENous Multiple  flumazenil (ROMAZICON) 0.1 mg/mL injection 0.2 mg  0.2 mg IntraVENous Multiple  simethicone (MYLICON) 19MX/6.9HA oral drops 80 mg  1.2 mL Oral Multiple  atropine injection 0.4 mg  0.4 mg IntraVENous ONCE PRN  
 EPINEPHrine (ADRENALIN) 0.1 mg/mL syringe 1 mg  1 mg Endoscopically ONCE PRN Social History: 
Social History Substance Use Topics  Smoking status: Former Smoker Quit date: 34  Smokeless tobacco: Former User Quit date: 1968 Comment: SMOKED CIGARS FOR 10 YEARS  
 Alcohol use No  
   Comment: QUIT  Family History: 
Family History Problem Relation Age of Onset  Diabetes Mother  Diabetes Sister  Pneumonia Father  Cancer Father PROSTATE  Anesth Problems Neg Hx Review of Systems: 
 
 
Constitutional: negative fever, negative chills, negative weight loss Eyes:   negative visual changes ENT:   negative sore throat, tongue or lip swelling Respiratory:  negative cough, negative dyspnea Cards:  negative for chest pain, palpitations, lower extremity edema GI:   See HPI 
:  negative for frequency, dysuria Integument:  negative for rash and pruritus Heme:  negative for easy bruising and gum/nose bleeding Musculoskel: negative for myalgias,  back pain and muscle weakness Neuro: negative for headaches, dizziness, vertigo Psych:  negative for feelings of anxiety, depression Objective:  
Patient Vitals for the past 8 hrs: 
 BP Temp Pulse Resp SpO2 Height Weight 10/04/18 0925 112/75 97.5 °F (36.4 °C) 67 19 98 % 6' 1\" (1.854 m) 101.2 kg (223 lb) EXAM:   
 NEURO-a&o HEENT-wnl LUNGS-clear COR-regular rate and rhythym ABD-soft , no tenderness, no rebound, good bs EXT-no edema Data Review No results for input(s): WBC, HGB, HCT, PLT, HGBEXT, HCTEXT, PLTEXT in the last 72 hours. No results for input(s): NA, K, CL, CO2, BUN, CREA, GLU, PHOS, CA in the last 72 hours. No results for input(s): SGOT, GPT, AP, TBIL, TP, ALB, GLOB, GGT, AML, LPSE in the last 72 hours. No lab exists for component: AMYP, HLPSE No results for input(s): INR, PTP, APTT in the last 72 hours. No lab exists for component: INREXT Patient Active Problem List  
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  
· Gastric and duodenal ulcers. 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   
 
Signed By: Aurea Ladd MD   
 10/4/2018  10:47 AM

## 2018-10-04 NOTE — PERIOP NOTES
Procedure being performed under MAC; Jacinto Ascencio CRNA at bedside monitoring patient at 1056. See anesthesia notes. Endoscope was pre-cleaned at bedside immediately following procedure by [facility] Tech. at 1100. Care of patient assumed from the anesthesia provider at [phone]. Patient tolerated procedure well. Abdomen remains soft and non tender post procedure, no complaints or indication of discomfort noted at this time. See anesthesia note. Patient transferred to Endoscopy Recovery and report given to recovery nurse [phone] recovery room RN.

## 2018-10-15 NOTE — ANESTHESIA POSTPROCEDURE EVALUATION
Post-Anesthesia Evaluation and Assessment Patient: Buck Small. MRN: [id number]  SSN: [id number] YOB: 1940  Age: [de-identified] y.o. Sex: male Cardiovascular Function/Vital Signs Visit Vitals  /58  Pulse 61  Temp 36.5 °C (97.7 °F)  Resp 21  
 Ht 6' 1\" (1.854 m)  Wt 101.2 kg (223 lb)  SpO2 100%  BMI 29.42 kg/m2 Patient is status post MAC anesthesia for Procedure(s): ESOPHAGOGASTRODUODENOSCOPY (EGD) ESOPHAGOGASTRODUODENAL (EGD) BIOPSY. Nausea/Vomiting: None Postoperative hydration reviewed and adequate. Pain: 
Pain Scale 1: Numeric (0 - 10) (10/04/18 1131) Pain Intensity 1: 0 (10/04/18 1131) Managed Neurological Status: At baseline Mental Status and Level of Consciousness: Arousable Pulmonary Status:  
O2 Device: Room air (10/04/18 1131) Adequate oxygenation and airway patent Complications related to anesthesia: None Post-anesthesia assessment completed. No concerns Signed By: Arlys Najjar, MD   
 October 15, 2018

## 2019-11-14 ENCOUNTER — HOSPITAL ENCOUNTER (OUTPATIENT)
Age: 79
Setting detail: OUTPATIENT SURGERY
Discharge: HOME | End: 2019-11-14
Attending: INTERNAL MEDICINE | Admitting: INTERNAL MEDICINE
Payer: MEDICARE

## 2019-11-14 ENCOUNTER — ANESTHESIA (OUTPATIENT)
Dept: ENDOSCOPY | Age: 79
End: 2019-11-14
Payer: MEDICARE

## 2019-11-14 ENCOUNTER — ANESTHESIA EVENT (OUTPATIENT)
Dept: ENDOSCOPY | Age: 79
End: 2019-11-14
Payer: MEDICARE

## 2019-11-14 VITALS
OXYGEN SATURATION: 98 % | WEIGHT: 209.88 LBS | HEART RATE: 72 BPM | HEIGHT: 73 IN | TEMPERATURE: 98.1 F | SYSTOLIC BLOOD PRESSURE: 124 MMHG | DIASTOLIC BLOOD PRESSURE: 72 MMHG | BODY MASS INDEX: 27.82 KG/M2 | RESPIRATION RATE: 21 BRPM

## 2019-11-14 LAB
GLUCOSE BLD STRIP.AUTO-MCNC: 106 MG/DL (ref 65–100)
SERVICE CMNT-IMP: ABNORMAL

## 2019-11-14 PROCEDURE — 82962 GLUCOSE BLOOD TEST: CPT

## 2019-11-14 PROCEDURE — 88305 TISSUE EXAM BY PATHOLOGIST: CPT

## 2019-11-14 PROCEDURE — 77030013992 HC SNR POLYP ENDOSC BSC -B: Performed by: INTERNAL MEDICINE

## 2019-11-14 PROCEDURE — 74011250636 HC RX REV CODE- 250/636: Performed by: NURSE ANESTHETIST, CERTIFIED REGISTERED

## 2019-11-14 PROCEDURE — 74011000250 HC RX REV CODE- 250: Performed by: NURSE ANESTHETIST, CERTIFIED REGISTERED

## 2019-11-14 PROCEDURE — 76060000031 HC ANESTHESIA FIRST 0.5 HR: Performed by: INTERNAL MEDICINE

## 2019-11-14 PROCEDURE — 74011250637 HC RX REV CODE- 250/637: Performed by: INTERNAL MEDICINE

## 2019-11-14 PROCEDURE — 74011250636 HC RX REV CODE- 250/636: Performed by: INTERNAL MEDICINE

## 2019-11-14 PROCEDURE — 76040000019: Performed by: INTERNAL MEDICINE

## 2019-11-14 RX ORDER — MIDAZOLAM HYDROCHLORIDE 1 MG/ML
.25-5 INJECTION, SOLUTION INTRAMUSCULAR; INTRAVENOUS
Status: DISCONTINUED | OUTPATIENT
Start: 2019-11-14 | End: 2019-11-14 | Stop reason: HOSPADM

## 2019-11-14 RX ORDER — PROPOFOL 10 MG/ML
INJECTION, EMULSION INTRAVENOUS AS NEEDED
Status: DISCONTINUED | OUTPATIENT
Start: 2019-11-14 | End: 2019-11-14 | Stop reason: HOSPADM

## 2019-11-14 RX ORDER — SODIUM CHLORIDE 9 MG/ML
50 INJECTION, SOLUTION INTRAVENOUS CONTINUOUS
Status: DISCONTINUED | OUTPATIENT
Start: 2019-11-14 | End: 2019-11-14 | Stop reason: HOSPADM

## 2019-11-14 RX ORDER — SODIUM CHLORIDE 9 MG/ML
INJECTION, SOLUTION INTRAVENOUS
Status: DISCONTINUED | OUTPATIENT
Start: 2019-11-14 | End: 2019-11-14 | Stop reason: HOSPADM

## 2019-11-14 RX ORDER — LIDOCAINE HYDROCHLORIDE 20 MG/ML
INJECTION, SOLUTION EPIDURAL; INFILTRATION; INTRACAUDAL; PERINEURAL AS NEEDED
Status: DISCONTINUED | OUTPATIENT
Start: 2019-11-14 | End: 2019-11-14 | Stop reason: HOSPADM

## 2019-11-14 RX ORDER — DEXTROMETHORPHAN/PSEUDOEPHED 2.5-7.5/.8
1.2 DROPS ORAL
Status: DISCONTINUED | OUTPATIENT
Start: 2019-11-14 | End: 2019-11-14 | Stop reason: HOSPADM

## 2019-11-14 RX ORDER — NALOXONE HYDROCHLORIDE 0.4 MG/ML
0.4 INJECTION, SOLUTION INTRAMUSCULAR; INTRAVENOUS; SUBCUTANEOUS
Status: DISCONTINUED | OUTPATIENT
Start: 2019-11-14 | End: 2019-11-14 | Stop reason: HOSPADM

## 2019-11-14 RX ORDER — ATROPINE SULFATE 0.1 MG/ML
0.4 INJECTION INTRAVENOUS
Status: DISCONTINUED | OUTPATIENT
Start: 2019-11-14 | End: 2019-11-14 | Stop reason: HOSPADM

## 2019-11-14 RX ORDER — FLUMAZENIL 0.1 MG/ML
0.2 INJECTION INTRAVENOUS
Status: DISCONTINUED | OUTPATIENT
Start: 2019-11-14 | End: 2019-11-14 | Stop reason: HOSPADM

## 2019-11-14 RX ORDER — PROPOFOL 10 MG/ML
INJECTION, EMULSION INTRAVENOUS
Status: DISCONTINUED | OUTPATIENT
Start: 2019-11-14 | End: 2019-11-14 | Stop reason: HOSPADM

## 2019-11-14 RX ORDER — EPINEPHRINE 0.1 MG/ML
1 INJECTION INTRACARDIAC; INTRAVENOUS
Status: DISCONTINUED | OUTPATIENT
Start: 2019-11-14 | End: 2019-11-14 | Stop reason: HOSPADM

## 2019-11-14 RX ADMIN — PROPOFOL 50 MG: 10 INJECTION, EMULSION INTRAVENOUS at 10:45

## 2019-11-14 RX ADMIN — LIDOCAINE HYDROCHLORIDE 60 MG: 20 INJECTION, SOLUTION INTRAVENOUS at 10:45

## 2019-11-14 RX ADMIN — SIMETHICONE 1.2 ML: 20 SUSPENSION/ DROPS ORAL at 10:59

## 2019-11-14 RX ADMIN — SODIUM CHLORIDE: 9 INJECTION, SOLUTION INTRAVENOUS at 10:39

## 2019-11-14 RX ADMIN — PROPOFOL 100 MCG/KG/MIN: 10 INJECTION, EMULSION INTRAVENOUS at 10:45

## 2019-11-14 RX ADMIN — SODIUM CHLORIDE 50 ML/HR: 900 INJECTION, SOLUTION INTRAVENOUS at 10:46

## 2019-11-14 NOTE — DISCHARGE INSTRUCTIONS
[facility]. Tevin Bee M.D.  [phone]                 COLON and EGD DISCHARGE INSTRUCTIONS    2019    Ivette Lanes Bowe Sr.  :  1940  [facility] Medical Record Number:  [id number]      DISCOMFORT:  Sore throat- throat lozenges or warm salt water gargle  Redness at IV site- apply warm compress to area; if redness or soreness persist- contact your physician  There may be a slight amount of blood passed from the rectum  Gaseous discomfort- walking, belching will help relieve any discomfort  You may not operate a vehicle for 12 hours  You may not engage in an occupation involving machinery or appliances for rest of today  You may not drink alcoholic beverages for at least 12 hours  Avoid making any critical decisions for at least 24 hour  DIET:   High fiber diet. - however -  remember your colon is empty and a heavy meal will produce gas. Avoid these foods:  vegetables, fried / greasy foods, carbonated drinks for today     ACTIVITY:  You may  resume your normal daily activities it is recommended that you spend the remainder of the day resting -  avoid any strenuous activity and driving. CALL M.D. ANY SIGN OF:   Increasing pain, nausea, vomiting  Abdominal distension (swelling)  New increased bleeding (oral or rectal)  Fever (chills)  Pain in chest area  Bloody discharge from nose or mouth  Shortness of breath      Follow-up Instructions:   Call Dr. Jori Hearn if any questions at [phone]. Results of procedure / biopsy in 7 to 10 days, we will call you with these results. Your endoscopy showed normal mucosa throughout, no stricture seen. Empiric dilation was performed. Your colonoscopy showed 4 polyps that were removed and sent to pathology. No need for repeat colonoscopy based on age.

## 2019-11-14 NOTE — PROGRESS NOTES
Veto Stage Sr.  1940  [id number]    Situation:  Verbal report received from:   Brandy Cueva RN   Procedure: Procedure(s) with comments:  ESOPHAGOGASTRODUODENOSCOPY (EGD)  COLONOSCOPY  ESOPHAGEAL DILATION - Marci #54  ENDOSCOPIC POLYPECTOMY    Background:    Preoperative diagnosis: dysphagia/screening  Postoperative diagnosis: Normal EGD, Diverticulosis, hemorrhoids, polyps. :  Dr. Eyad Blank   Assistant(s): Endoscopy Technician-1: Brielle Gray  Endoscopy RN-1: Catherine Rosas RN    Specimens:   ID Type Source Tests Collected by Time Destination   1 : transverse colon polyps Preservative Colon, Transverse  Corinne Boyd MD 11/14/2019 1104 Pathology   2 : sigmoid colon polyp Preservative Sigmoid  Corinne Boyd MD 11/14/2019 1107 Pathology     H. Pylori  no    Assessment:  Intra-procedure medications       Anesthesia gave intra-procedure sedation and medications, see anesthesia flow sheet yes    Intravenous fluids: NS@ KVO     Vital signs stable   yes    Abdominal assessment: round and soft   yes    Recommendation:  Discharge patient per MD order  yes.   Return to floor  outpatient  Family or Friend daughter   Permission to share finding with family or friend yes

## 2019-11-14 NOTE — PERIOP NOTES
1047  Anesthesia staff at patient's bedside administering anesthesia and monitoring patients vital signs throughout procedure. See anesthesia note. 1108  Endoscope was pre-cleaned at bedside immediately following procedure by hemanth Mckeon. 1112  Patient tolerated procedure without problems. Abdomen soft and patient arousable and voices no complaints Report received from CRNA, see anesthesia note. Patient transported to endoscopy recovery area. Report given to DEE POOLE RN.

## 2019-11-14 NOTE — ANESTHESIA PREPROCEDURE EVALUATION
Relevant Problems   No relevant active problems       Anesthetic History   No history of anesthetic complications            Review of Systems / Medical History  Patient summary reviewed and pertinent labs reviewed    Pulmonary  Within defined limits                 Neuro/Psych   Within defined limits          Comments: Hearing aids Cardiovascular  Within defined limits                     GI/Hepatic/Renal           PUD    Comments: dysphagia Endo/Other    Diabetes    Arthritis     Other Findings              Physical Exam    Airway  Mallampati: II    Neck ROM: normal range of motion   Mouth opening: Normal     Cardiovascular    Rhythm: regular  Rate: normal         Dental    Dentition: Poor dentition  Comments: Multiple missing   Pulmonary  Breath sounds clear to auscultation               Abdominal  GI exam deferred       Other Findings            Anesthetic Plan    ASA: 3  Anesthesia type: MAC          Induction: Intravenous  Anesthetic plan and risks discussed with: Patient

## 2019-11-14 NOTE — PROCEDURES
Lars Juarez M.D.  [phone]            2019          Colonoscopy Operative Report  Doug Arroyo Sr.  :  1940  [facility] Medical Record Number:  [id number]      Indications:    Personal history of colon polyps (screening only)     :  Carlos Elder MD    Referring Provider: Claudell Rolls, MD    Sedation:  MAC anesthesia    Pre-Procedural Exam:      Airway: clear,  No airway problems anticipated  Heart: RRR, without gallops or rubs  Lungs: clear bilaterally without wheezes, crackles, or rhonchi  Abdomen: soft, nontender, nondistended, bowel sounds present  Mental Status: awake, alert and oriented to person, place and time     Procedure Details:  After informed consent was obtained with all risks and benefits of procedure explained and preoperative exam completed, the patient was taken to the endoscopy suite and placed in the left lateral decubitus position. Upon sequential sedation as per above, a digital rectal exam was performed. The Olympus videocolonoscope  was inserted in the rectum and carefully advanced to the cecum, which was identified by the ileocecal valve and appendiceal orifice. The quality of preparation was good. The colonoscope was slowly withdrawn with careful inspection and evaluation between folds. Retroflexion in the rectum was performed. Findings:   Terminal Ileum: not intubated  Cecum: normal  Ascending Colon: normal  Transverse Colon: 3  Sessile polyp(s), the largest 10 mm in size; Descending Colon: no mucosal lesion appreciated  mild diverticulosis; Sigmoid: 1  Pedunculated polyp(s), the largest 12 mm in size  moderate diverticulosis; Rectum: no mucosal lesion appreciated  Grade 1 internal hemorrhoid(s);     Interventions:  4 complete polypectomy were performed using hot snare  and the polyps were  retrieved    Specimen Removed:  specimen #1, 5, 7 and 10 mm in size, located in the transverse colon removed by snare cautery and retrieved for pathology  #2, 12 mm in size, located in the sigmoid removed by snare cautery and retrieved for pathology    Complications: None. EBL:  None. Impression:  A total of 4 polyps were removed and sent to pathology, otherwise mucosa within normal.                         Left colon diverticulosis and internal hemorrhoids    Recommendations:  -High fiber diet.    -Resume normal medication(s). -No need for repeat colonoscopy based on age. Discharge Disposition:  Home in the company of a  when able to ambulate.     Andreas Rivas MD  11/14/2019  11:14 AM

## 2019-11-14 NOTE — PROCEDURES
Lars Juarez M.D.  [phone]           2019                EGD Operative Report  Alesia Telles Sr.  :  1940  Jonatan Steven Medical Record Number:  [id number]      Indication:  Dysphagia/odynophagia     : Carlos Elder MD    Referring Provider:  Claudell Rolls, MD      Anesthesia/Sedation:  MAC anesthesia    Airway assessment: No airway problems anticipated    Pre-Procedural Exam:      Airway: clear, no airway problems anticipated  Heart: RRR, without gallops or rubs  Lungs: clear bilaterally without wheezes, crackles, or rhonchi  Abdomen: soft, nontender, nondistended, bowel sounds present  Mental Status: awake, alert and oriented to person, place and time       Procedure Details     After infomed consent was obtained for the procedure, with all risks and benefits of procedure explained the patient was taken to the endoscopy suite and placed in the left lateral decubitus position. Following sequential administration of sedation as per above, the endoscope was inserted into the mouth and advanced under direct vision to second portion of the duodenum. A careful inspection was made as the gastroscope was withdrawn, including a retroflexed view of the proximal stomach; findings and interventions are described below. Findings:   Esophagus:normal, no stricture or mucosal lesions seen. Stomach: normal   Duodenum/jejunum: normal    Therapies:  esophageal dilation with savary sizes 48 Fr    Specimens: none           Complications:   None; patient tolerated the procedure well. EBL:  None. Impression:    Upper endoscopy within normal    Recommendations:    - Instructed to chew food very well, eat slowly and have sips of water with meals.     Carlos Elder MD

## 2019-11-14 NOTE — H&P
The patient is a 78year old male who presents with a complaint of difficulty swallowing. The patient presents for due to new symptoms (Pt presents with complaints of dysphagia.  He states this is intermittent and happens with medications.  This has been persisting for about one year and has become gradually worse.  He denies heartburn or acid reflux. [facility] notices this occur with liquids and solids. ). The symptoms have been associated with vomiting. Problem List/Past Medical (Mike Bagley; 10/9/2019 8:47 AM)  Dysphagia    Hiatal Hernia    Diabetes Mellitus    Family history of colon cancer (V16.0) (V16.0  Z80.0)    Esophagitis, reflux (530.11  K21.0)      Past Surgical History (Mike Bagley; 10/9/2019 8:47 AM)  Kidney Yoc[facility] Surgery      Allergies (Mike Bagley; 10/9/2019 8:47 AM)  No Known Drug Allergies  [10/12/2011]:    Medication History (Mike Bagley; 10/9/2019 8:47 AM)  Ally Bullion (40MG Capsule DR, 1 (one) Capsule Oral 30 minutes before breakfast daily, Taken starting 12/02/2018) Active. Loratadine  (10MG Capsule, Oral daily) Active. MetFORMIN HCl  (750MG Tablet ER 24HR, Oral BID) Active. Fenofibrate Micronized  (54MG Tablet, Oral BEFORE MEALS) Active. Ramipril  (2.5MG Capsule, Oral Daily) Active. Aspirin EC  (81MG Tablet DR, Oral Daily) Active. Medications Reconciled     Family History (Mike Bagley; 10/9/2019 8:47 AM)  Daughter   diabetes  Sibling   diabetes  Stroke   Father. passed  Diabetes Mellitus   Mother. passed    Social History (Mike Bagley; 10/9/2019 8:47 AM)  Employment status   Part-time. Marital status   . Blood Transfusion   No.  Tobacco Use   Never smoker. Alcohol Use   Occasional alcohol use. Diagnostic Studies History (Mike Bagley; 10/9/2019 8:47 AM)  Colonoscopy  [2008]:     Other Problems (Mike Bagley; 10/9/2019 8:47 AM)  Colon cancer screening (V76.51  Z12.11)          Review of Systems (Mike Bagley; 10/9/2019 8:47 AM)  General Not Present- Chronic Fatigue, Poor Appetite, Weight Gain and Weight Loss. Skin Not Present- Itching, Rash and Skin Color Changes. HEENT Not Present- Hearing Loss and Vertigo. Respiratory Not Present- Difficulty Breathing and TB exposure. Cardiovascular Not Present- Chest Pain, Use of Antibiotics before Dental Procedures and Use of Blood Thinners. Gastrointestinal Present- See HPI. Musculoskeletal Not Present- Arthritis, Hip Replacement Surgery and Knee Replacement Surgery. Neurological Not Present- Weakness. Psychiatric Not Present- Depression. Endocrine Not Present- Diabetes and Thyroid Problems. Hematology Not Present- Anemia. Vitals (Noeyne Pale; 10/9/2019 8:50 AM)  10/9/2019 8:47 AM  Weight: 217 lb   Height: 74 in   Body Surface Area: 2.25 m²   Body Mass Index: 27.86 kg/m²    BP: 130/70 (Sitting, Left Arm, Standard)              Physical Exam (Anthony Shepherd AGACNP; 10/9/2019 9:13 AM)  General  Mental Status - Alert. General Appearance - Cooperative, Pleasant, Not in acute distress. Orientation - Oriented X3. Integumentary  General Characteristics  Color - normal coloration of skin. Head and Neck  Neck  Global Assessment - supple. Eye  Sclera/Conjunctiva - Bilateral - No Jaundice. Chest and Lung Exam  Chest and lung exam reveals  - quiet, even and easy respiratory effort with no use of accessory muscles. Auscultation  Breath sounds - Normal. Adventitious sounds - No Adventitious sounds. Cardiovascular  Auscultation  Rhythm - Regular, No Tachycardia, No Bradycardia . Heart Sounds - Normal heart sounds , S1 WNL and S2 WNL, No S3, No Summation Gallop. Murmurs & Other Heart Sounds - Auscultation of the heart reveals - No Murmurs. Abdomen  Palpation/Percussion  Tenderness - Non-Tender. Rebound tenderness - No rebound. Rigidity (guarding) - No Rigidity. Dullness to percussion - No abnormal dullness to percussion. Liver - No hepatosplenomegaly. Abdominal Mass Palpable - No masses.  Other Characteristics - No Ascites. Auscultation  Auscultation of the abdomen reveals - Bowel sounds normal, No Abdominal bruits and No Succussion splash. Rectal - Did not examine. Peripheral Vascular  Lower Extremity  Palpation - Edema - Left - No edema. Right - No edema. Neurologic  Motor  Involuntary Movements - Asterixis - not present. Assessment & Plan (Anthony CHRISTENSEN; 10/9/2019 9:15 AM)  Dysphagia (787.20  R13.10)  Impression: Diet modifications to manage Dysphagia were explained in detail. EGD with dilation for further evaluation  Current Plans  Endoscopy (36716) (Discussed risks and benefits with the patient to include: perforation, post polypectomy, or post biopsy bleeding, missed lesions, and sedation reactions.)  Colon cancer screening (V76.51  Z12.11)  Impression: He desires to proceed with screening colonoscopy at time of EGD. Last done in 2014 and polyps were found. I gave him sample of suprep for his bowel prep. Current Plans  Colonoscopy (79226) (Discussed risks and benefits with the patient to include:; perforation, post polypectomy, or post biopsy bleeding, missed lesions, and sedation reactions.)  Pt Education - How to access health information online: discussed with patient and provided information. Medical Decision Making (Anthony CHRISTENSEN; 10/9/2019 9:17 AM)  Amount/complexity of data to be reviewed:  - Review and summarization of old records      Signed electronically by FERMIN Bunch (10/9/2019 9:17 AM)        Co-signed by Chris Lopez MD (10/13/2019 6:47 PM)

## 2019-11-14 NOTE — ANESTHESIA POSTPROCEDURE EVALUATION
Procedure(s):  ESOPHAGOGASTRODUODENOSCOPY (EGD)  COLONOSCOPY  ESOPHAGEAL DILATION  ENDOSCOPIC POLYPECTOMY. MAC    Anesthesia Post Evaluation      Multimodal analgesia: multimodal analgesia not used between 6 hours prior to anesthesia start to PACU discharge  Patient location during evaluation: PACU  Patient participation: complete - patient participated  Level of consciousness: awake  Pain management: adequate  Airway patency: patent  Anesthetic complications: no  Cardiovascular status: acceptable, blood pressure returned to baseline and hemodynamically stable  Respiratory status: acceptable  Hydration status: acceptable  Post anesthesia nausea and vomiting:  controlled      Vitals Value Taken Time   /67 11/14/2019 11:18 AM   Temp     Pulse 66 11/14/2019 11:21 AM   Resp 19 11/14/2019 11:23 AM   SpO2 94 % 11/14/2019 11:23 AM   Vitals shown include unvalidated device data.

## 2020-02-11 ENCOUNTER — HOSPITAL ENCOUNTER (OUTPATIENT)
Dept: PREADMISSION TESTING | Age: 80
Discharge: HOME | End: 2020-02-11
Payer: MEDICARE

## 2020-02-11 VITALS
HEART RATE: 50 BPM | HEIGHT: 74 IN | WEIGHT: 202.5 LBS | BODY MASS INDEX: 25.99 KG/M2 | DIASTOLIC BLOOD PRESSURE: 69 MMHG | TEMPERATURE: 97.7 F | RESPIRATION RATE: 18 BRPM | SYSTOLIC BLOOD PRESSURE: 116 MMHG

## 2020-02-11 LAB
ANION GAP SERPL CALC-SCNC: 5 MMOL/L (ref 5–15)
ATRIAL RATE: 73 BPM
BASOPHILS # BLD: 0 K/UL (ref 0–0.1)
BASOPHILS NFR BLD: 0 % (ref 0–1)
BUN SERPL-MCNC: 21 MG/DL (ref 6–20)
BUN/CREAT SERPL: 15 (ref 12–20)
CALCIUM SERPL-MCNC: 9.7 MG/DL (ref 8.5–10.1)
CALCULATED P AXIS, ECG09: 67 DEGREES
CALCULATED R AXIS, ECG10: -14 DEGREES
CALCULATED T AXIS, ECG11: 44 DEGREES
CHLORIDE SERPL-SCNC: 105 MMOL/L (ref 97–108)
CO2 SERPL-SCNC: 28 MMOL/L (ref 21–32)
CREAT SERPL-MCNC: 1.43 MG/DL (ref 0.7–1.3)
DIAGNOSIS, 93000: NORMAL
DIFFERENTIAL METHOD BLD: ABNORMAL
EOSINOPHIL # BLD: 0.2 K/UL (ref 0–0.4)
EOSINOPHIL NFR BLD: 2 % (ref 0–7)
ERYTHROCYTE [DISTWIDTH] IN BLOOD BY AUTOMATED COUNT: 12.6 % (ref 11.5–14.5)
EST. AVERAGE GLUCOSE BLD GHB EST-MCNC: 120 MG/DL
GLUCOSE SERPL-MCNC: 96 MG/DL (ref 65–100)
HBA1C MFR BLD: 5.8 % (ref 4–5.6)
HCT VFR BLD AUTO: 49.8 % (ref 36.6–50.3)
HGB BLD-MCNC: 15.7 G/DL (ref 12.1–17)
IMM GRANULOCYTES # BLD AUTO: 0 K/UL (ref 0–0.04)
IMM GRANULOCYTES NFR BLD AUTO: 1 % (ref 0–0.5)
LYMPHOCYTES # BLD: 1.3 K/UL (ref 0.8–3.5)
LYMPHOCYTES NFR BLD: 16 % (ref 12–49)
MCH RBC QN AUTO: 29.8 PG (ref 26–34)
MCHC RBC AUTO-ENTMCNC: 31.5 G/DL (ref 30–36.5)
MCV RBC AUTO: 94.7 FL (ref 80–99)
MONOCYTES # BLD: 0.8 K/UL (ref 0–1)
MONOCYTES NFR BLD: 10 % (ref 5–13)
NEUTS SEG # BLD: 5.8 K/UL (ref 1.8–8)
NEUTS SEG NFR BLD: 71 % (ref 32–75)
NRBC # BLD: 0 K/UL (ref 0–0.01)
NRBC BLD-RTO: 0 PER 100 WBC
P-R INTERVAL, ECG05: 162 MS
PLATELET # BLD AUTO: 274 K/UL (ref 150–400)
PMV BLD AUTO: 11.4 FL (ref 8.9–12.9)
POTASSIUM SERPL-SCNC: 4.4 MMOL/L (ref 3.5–5.1)
Q-T INTERVAL, ECG07: 366 MS
QRS DURATION, ECG06: 74 MS
QTC CALCULATION (BEZET), ECG08: 403 MS
RBC # BLD AUTO: 5.26 M/UL (ref 4.1–5.7)
SODIUM SERPL-SCNC: 138 MMOL/L (ref 136–145)
VENTRICULAR RATE, ECG03: 73 BPM
WBC # BLD AUTO: 8.1 K/UL (ref 4.1–11.1)

## 2020-02-11 PROCEDURE — 83036 HEMOGLOBIN GLYCOSYLATED A1C: CPT

## 2020-02-11 PROCEDURE — 93005 ELECTROCARDIOGRAM TRACING: CPT

## 2020-02-11 PROCEDURE — 80048 BASIC METABOLIC PNL TOTAL CA: CPT

## 2020-02-11 PROCEDURE — 85025 COMPLETE CBC W/AUTO DIFF WBC: CPT

## 2020-02-11 RX ORDER — ATORVASTATIN CALCIUM 10 MG/1
10 TABLET, FILM COATED ORAL
COMMUNITY

## 2020-02-11 RX ORDER — METFORMIN HYDROCHLORIDE 750 MG/1
750 TABLET, EXTENDED RELEASE ORAL
COMMUNITY

## 2020-02-11 RX ORDER — GUAIFENESIN 100 MG/5ML
81 LIQUID (ML) ORAL
Status: ON HOLD | COMMUNITY
End: 2022-03-31

## 2020-02-11 RX ORDER — METFORMIN HYDROCHLORIDE 500 MG/1
TABLET, FILM COATED, EXTENDED RELEASE ORAL
COMMUNITY
End: 2020-02-11

## 2020-02-11 RX ORDER — FAMOTIDINE 20 MG/1
20 TABLET, FILM COATED ORAL
COMMUNITY
End: 2022-03-31

## 2020-02-13 NOTE — PERIOP NOTES
CALLED DR. MARCIA DELGADO (CARDIO) OFFICE AT [phone] [facility] AND IF THERE IS A CLEARANCE NOTE , REQUESTING IT ALL BE FAXED TO [facility]  [facility] AT [phone]. OFFICE STAFF AT DR. DELGADO SAY THAT EKG HAS NOT BEEN SCANNED IN YET FROM THAT APPOINTMENT AND THAT THE OFFICE NOTE FROM 2/12/2020 DOES NOT HAVE ANY INFORMATION STATING PATIENT IS HAVING SURGERY 2/25/2020 WITH DR. Shandra Rodriguez OFFICE STAFF MEMBER WILL LET DR. DELGADO AND HIS NURSE KNOW OF THIS UPCOMING SURGERY AND WILL FAX THE OFFICE NOTE SHE DOES HAVE WITH THE EKG TO COME AS SOON AS ITS SCANNED INTO THEIR SYSTEM.     1411 -CARDIAC CLEARANCE NOTE ON CHART FROM DR. DELGADO'S OFFICE.

## 2020-02-24 ENCOUNTER — ANESTHESIA EVENT (OUTPATIENT)
Dept: SURGERY | Age: 80
End: 2020-02-24
Payer: MEDICARE

## 2020-02-24 NOTE — H&P
Lissy Valentino  : 1940  DOS: 2020    Chief Complaint: Follow Up       Allergies: No Known Drug Allergies (NKDA)       Medications: Allergy (Loratadine) 10 mg oral tablet , tamsulosin 0.4 mg oral capsule , dutasteride 0.5 mg oral capsule , Vitamin B12 , Metformin , Fenofibrate , Aspirin 81       Medical History: Height: 6' 1\", Weight: 218 lbs    Pulmonary System: Negative  Cardiac System: Negative  Blood and Liver Systems: Negative  Neurologic and Endocrine Systems: Negative  GI,  and Reproductive Systems: Negative  Cancer: Negative   Surgical History: Prostate surgery ,  Blood Clot Filter ,  Hernia Repair ,       Family History: None Indicated       Social History: Smoking Status: Non-Smoker            Mr. Jono Maria is an 56-year-old male who is a long-standing skin cancer patient that I have seen for a number of years. 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 Modesto Acharya. He was seen recently and had another lightly pigmented spot shave-excised on the right side of his apical scalp, that by their report was an additional melanoma in situ with inadequate excision margins. He was adamant that I be the one to provide the re-excision service to him. Review of systems reveals normal heart and lung sounds. Examination of the right side of his apical scalp posterior to a line from the apex of both ears and well to the right of the well-healed scars from previous excision, is a 1.25 cm superficial shave excision wound. I do not see any visible pigmented component. I had a discussion with Mr. Arroyo and his daughter regarding this recent melanoma in situ. He is a candidate for wide local excision with at least a 5 mm excision margin on the scalp under a conscious sedation setting with local anesthesia on an outpatient basis.   If we can keep his excision defect less than 2.5 cm, I believe with extensive undermining, we will be able to close this in a linear fashion. If for some reason his excision defect cannot be closed in a linear fashion, we would then harvest a thin full-thickness skin graft from the supraclavicular fossa on the same side, close the donor site defect in layers with dissolvable sutures, and place the skin graft over the excision site. He would have a bolster compression dressing in place for about 7-10 days and there are activity restrictions while he is healing. The risks and complications include but are not limited to infection, pain, bleeding, hematomas requiring re-operation, skin sensitivity changes, vascular compromise to tissues resulting in partial or complete loss of tissues, resultant open wounds, the need for long term wound care and dressing changes and scarring, irregularities and asymmetries requiring touch-up and revision surgery, an unacceptable cosmetic result, and other things including cardiac and pulmonary risks that include death. Their questions were answered and well try to obtain authorization to treat this most recent biopsy-proven melanoma in situ. I will need to see the pathology report to confirm the diagnosis before surgery. Catherine Ortiz M.D.  FACS

## 2020-02-25 ENCOUNTER — HOSPITAL ENCOUNTER (OUTPATIENT)
Age: 80
Setting detail: OUTPATIENT SURGERY
Discharge: HOME | End: 2020-02-25
Attending: SURGERY | Admitting: SURGERY
Payer: MEDICARE

## 2020-02-25 ENCOUNTER — ANESTHESIA (OUTPATIENT)
Dept: SURGERY | Age: 80
End: 2020-02-25
Payer: MEDICARE

## 2020-02-25 VITALS
RESPIRATION RATE: 16 BRPM | BODY MASS INDEX: 25.93 KG/M2 | TEMPERATURE: 97.8 F | HEIGHT: 74 IN | WEIGHT: 202 LBS | SYSTOLIC BLOOD PRESSURE: 126 MMHG | HEART RATE: 68 BPM | DIASTOLIC BLOOD PRESSURE: 68 MMHG | OXYGEN SATURATION: 92 %

## 2020-02-25 DIAGNOSIS — D03.4 MELANOMA IN SITU OF SCALP (HCC): Primary | ICD-10-CM

## 2020-02-25 LAB
GLUCOSE BLD STRIP.AUTO-MCNC: 93 MG/DL (ref 65–100)
GLUCOSE BLD STRIP.AUTO-MCNC: 97 MG/DL (ref 65–100)
SERVICE CMNT-IMP: NORMAL
SERVICE CMNT-IMP: NORMAL

## 2020-02-25 PROCEDURE — 77030040922 HC BLNKT HYPOTHRM STRY -A

## 2020-02-25 PROCEDURE — 88305 TISSUE EXAM BY PATHOLOGIST: CPT

## 2020-02-25 PROCEDURE — 76060000032 HC ANESTHESIA 0.5 TO 1 HR: Performed by: SURGERY

## 2020-02-25 PROCEDURE — 74011250636 HC RX REV CODE- 250/636: Performed by: NURSE ANESTHETIST, CERTIFIED REGISTERED

## 2020-02-25 PROCEDURE — 77030018836 HC SOL IRR NACL ICUM -A: Performed by: SURGERY

## 2020-02-25 PROCEDURE — 77030031139 HC SUT VCRL2 J&J -A: Performed by: SURGERY

## 2020-02-25 PROCEDURE — 76210000016 HC OR PH I REC 1 TO 1.5 HR: Performed by: SURGERY

## 2020-02-25 PROCEDURE — 74011000250 HC RX REV CODE- 250: Performed by: SURGERY

## 2020-02-25 PROCEDURE — 77030040361 HC SLV COMPR DVT MDII -B: Performed by: SURGERY

## 2020-02-25 PROCEDURE — 88341 IMHCHEM/IMCYTCHM EA ADD ANTB: CPT

## 2020-02-25 PROCEDURE — 74011250636 HC RX REV CODE- 250/636: Performed by: ANESTHESIOLOGY

## 2020-02-25 PROCEDURE — 76010000138 HC OR TIME 0.5 TO 1 HR: Performed by: SURGERY

## 2020-02-25 PROCEDURE — 82962 GLUCOSE BLOOD TEST: CPT

## 2020-02-25 PROCEDURE — 77030011640 HC PAD GRND REM COVD -A: Performed by: SURGERY

## 2020-02-25 PROCEDURE — 88342 IMHCHEM/IMCYTCHM 1ST ANTB: CPT

## 2020-02-25 PROCEDURE — 74011000250 HC RX REV CODE- 250: Performed by: NURSE ANESTHETIST, CERTIFIED REGISTERED

## 2020-02-25 PROCEDURE — 77030002916 HC SUT ETHLN J&J -A: Performed by: SURGERY

## 2020-02-25 PROCEDURE — 77030002996 HC SUT SLK J&J -A: Performed by: SURGERY

## 2020-02-25 PROCEDURE — 76210000020 HC REC RM PH II FIRST 0.5 HR: Performed by: SURGERY

## 2020-02-25 RX ORDER — MIDAZOLAM HYDROCHLORIDE 1 MG/ML
0.5 INJECTION, SOLUTION INTRAMUSCULAR; INTRAVENOUS
Status: DISCONTINUED | OUTPATIENT
Start: 2020-02-25 | End: 2020-02-25 | Stop reason: HOSPADM

## 2020-02-25 RX ORDER — HYDROCODONE BITARTRATE AND ACETAMINOPHEN 5; 325 MG/1; MG/1
1 TABLET ORAL
Qty: 20 TAB | Refills: 0 | Status: SHIPPED | OUTPATIENT
Start: 2020-02-25 | End: 2020-02-28

## 2020-02-25 RX ORDER — SODIUM CHLORIDE 9 MG/ML
25 INJECTION, SOLUTION INTRAVENOUS CONTINUOUS
Status: DISCONTINUED | OUTPATIENT
Start: 2020-02-25 | End: 2020-02-25 | Stop reason: HOSPADM

## 2020-02-25 RX ORDER — FENTANYL CITRATE 50 UG/ML
50 INJECTION, SOLUTION INTRAMUSCULAR; INTRAVENOUS AS NEEDED
Status: DISCONTINUED | OUTPATIENT
Start: 2020-02-25 | End: 2020-02-25 | Stop reason: HOSPADM

## 2020-02-25 RX ORDER — SODIUM CHLORIDE 0.9 % (FLUSH) 0.9 %
5-40 SYRINGE (ML) INJECTION EVERY 8 HOURS
Status: DISCONTINUED | OUTPATIENT
Start: 2020-02-25 | End: 2020-02-25 | Stop reason: HOSPADM

## 2020-02-25 RX ORDER — MIDAZOLAM HYDROCHLORIDE 1 MG/ML
1 INJECTION, SOLUTION INTRAMUSCULAR; INTRAVENOUS AS NEEDED
Status: DISCONTINUED | OUTPATIENT
Start: 2020-02-25 | End: 2020-02-25 | Stop reason: HOSPADM

## 2020-02-25 RX ORDER — SODIUM CHLORIDE 0.9 % (FLUSH) 0.9 %
5-40 SYRINGE (ML) INJECTION AS NEEDED
Status: DISCONTINUED | OUTPATIENT
Start: 2020-02-25 | End: 2020-02-25 | Stop reason: HOSPADM

## 2020-02-25 RX ORDER — ONDANSETRON 2 MG/ML
4 INJECTION INTRAMUSCULAR; INTRAVENOUS AS NEEDED
Status: DISCONTINUED | OUTPATIENT
Start: 2020-02-25 | End: 2020-02-25 | Stop reason: HOSPADM

## 2020-02-25 RX ORDER — FENTANYL CITRATE 50 UG/ML
25 INJECTION, SOLUTION INTRAMUSCULAR; INTRAVENOUS
Status: DISCONTINUED | OUTPATIENT
Start: 2020-02-25 | End: 2020-02-25 | Stop reason: HOSPADM

## 2020-02-25 RX ORDER — LIDOCAINE HYDROCHLORIDE AND EPINEPHRINE 10; 10 MG/ML; UG/ML
30 INJECTION, SOLUTION INFILTRATION; PERINEURAL ONCE
Status: COMPLETED | OUTPATIENT
Start: 2020-02-25 | End: 2020-02-25

## 2020-02-25 RX ORDER — HYDROMORPHONE HYDROCHLORIDE 1 MG/ML
0.2 INJECTION, SOLUTION INTRAMUSCULAR; INTRAVENOUS; SUBCUTANEOUS
Status: DISCONTINUED | OUTPATIENT
Start: 2020-02-25 | End: 2020-02-25 | Stop reason: HOSPADM

## 2020-02-25 RX ORDER — SODIUM CHLORIDE, SODIUM LACTATE, POTASSIUM CHLORIDE, CALCIUM CHLORIDE 600; 310; 30; 20 MG/100ML; MG/100ML; MG/100ML; MG/100ML
125 INJECTION, SOLUTION INTRAVENOUS CONTINUOUS
Status: DISCONTINUED | OUTPATIENT
Start: 2020-02-25 | End: 2020-02-25 | Stop reason: HOSPADM

## 2020-02-25 RX ORDER — DIPHENHYDRAMINE HYDROCHLORIDE 50 MG/ML
12.5 INJECTION, SOLUTION INTRAMUSCULAR; INTRAVENOUS AS NEEDED
Status: DISCONTINUED | OUTPATIENT
Start: 2020-02-25 | End: 2020-02-25 | Stop reason: HOSPADM

## 2020-02-25 RX ORDER — LIDOCAINE HYDROCHLORIDE 10 MG/ML
0.1 INJECTION, SOLUTION EPIDURAL; INFILTRATION; INTRACAUDAL; PERINEURAL AS NEEDED
Status: DISCONTINUED | OUTPATIENT
Start: 2020-02-25 | End: 2020-02-25 | Stop reason: HOSPADM

## 2020-02-25 RX ORDER — PROPOFOL 10 MG/ML
INJECTION, EMULSION INTRAVENOUS
Status: DISCONTINUED | OUTPATIENT
Start: 2020-02-25 | End: 2020-02-25 | Stop reason: HOSPADM

## 2020-02-25 RX ORDER — MORPHINE SULFATE 2 MG/ML
2 INJECTION, SOLUTION INTRAMUSCULAR; INTRAVENOUS
Status: DISCONTINUED | OUTPATIENT
Start: 2020-02-25 | End: 2020-02-25 | Stop reason: HOSPADM

## 2020-02-25 RX ORDER — DEXMEDETOMIDINE HYDROCHLORIDE 100 UG/ML
INJECTION, SOLUTION INTRAVENOUS AS NEEDED
Status: DISCONTINUED | OUTPATIENT
Start: 2020-02-25 | End: 2020-02-25 | Stop reason: HOSPADM

## 2020-02-25 RX ORDER — PROPOFOL 10 MG/ML
INJECTION, EMULSION INTRAVENOUS AS NEEDED
Status: DISCONTINUED | OUTPATIENT
Start: 2020-02-25 | End: 2020-02-25 | Stop reason: HOSPADM

## 2020-02-25 RX ORDER — ACETAMINOPHEN 325 MG/1
650 TABLET ORAL ONCE
Status: DISCONTINUED | OUTPATIENT
Start: 2020-02-25 | End: 2020-02-25 | Stop reason: HOSPADM

## 2020-02-25 RX ORDER — CEFAZOLIN SODIUM 1 G/3ML
INJECTION, POWDER, FOR SOLUTION INTRAMUSCULAR; INTRAVENOUS AS NEEDED
Status: DISCONTINUED | OUTPATIENT
Start: 2020-02-25 | End: 2020-02-25 | Stop reason: HOSPADM

## 2020-02-25 RX ORDER — OXYCODONE AND ACETAMINOPHEN 5; 325 MG/1; MG/1
1 TABLET ORAL AS NEEDED
Status: DISCONTINUED | OUTPATIENT
Start: 2020-02-25 | End: 2020-02-25 | Stop reason: HOSPADM

## 2020-02-25 RX ADMIN — PROPOFOL 25 MG: 10 INJECTION, EMULSION INTRAVENOUS at 11:35

## 2020-02-25 RX ADMIN — PROPOFOL 35 MCG/KG/MIN: 10 INJECTION, EMULSION INTRAVENOUS at 11:34

## 2020-02-25 RX ADMIN — PROPOFOL 25 MG: 10 INJECTION, EMULSION INTRAVENOUS at 11:37

## 2020-02-25 RX ADMIN — DEXMEDETOMIDINE HYDROCHLORIDE 16 MCG: 100 INJECTION, SOLUTION, CONCENTRATE INTRAVENOUS at 11:29

## 2020-02-25 RX ADMIN — CEFAZOLIN 2 G: 330 INJECTION, POWDER, FOR SOLUTION INTRAMUSCULAR; INTRAVENOUS at 11:33

## 2020-02-25 RX ADMIN — DEXMEDETOMIDINE HYDROCHLORIDE 12 MCG: 100 INJECTION, SOLUTION, CONCENTRATE INTRAVENOUS at 11:33

## 2020-02-25 RX ADMIN — SODIUM CHLORIDE, POTASSIUM CHLORIDE, SODIUM LACTATE AND CALCIUM CHLORIDE: 600; 310; 30; 20 INJECTION, SOLUTION INTRAVENOUS at 11:24

## 2020-02-25 NOTE — DISCHARGE INSTRUCTIONS
Leave the surgical dressings ON and DRY for 24 hours then may remove for a shower. Resume any pre op medications and diet  After shower, place antibiotic ointment of choice over the scalp incision with a bandage or gauze dressings. May repeat daily  Keep head elevated at night while sleeping about 30 degrees  Call DR. Leesa Mendoza at [phone] (cell) for questions or problems  Call the office at [phone] for follow up appointment in 2 weeks      ______________________________________________________________________    Anesthesia Discharge Instructions    After general anesthesia or intervenous sedation, for 24 hours or while taking prescription Narcotics:  · Limit your activities  · Do not drive or operate hazardous machinery  · If you have not urinated within 8 hours after discharge, please contact your surgeon on call. · Do not make important personal or business decisions  · Do not drink alcoholic beverages    Report the following to your surgeon:  · Excessive pain, swelling, redness or odor of or around the surgical area  · Temperature over 100.5 degrees  · Nausea and vomiting lasting longer than 4 hours or if unable to take medication  · Any signs of decreased circulation or nerve impairment to extremity:  Change in color, persistent numbness, tingling, coldness or increased pain.   · Any questions

## 2020-02-25 NOTE — OP NOTES
[address]   OPERATIVE REPORT    Name:  Andrew Mcdonald  MR#:  [id number]  :  1940  ACCOUNT #:  [de-identified]  DATE OF SERVICE:  2020    PREOPERATIVE DIAGNOSIS:  Melanoma in situ, right apical scalp; shave excision biopsy, incomplete excision. POSTOPERATIVE DIAGNOSIS:  Melanoma in situ, right apical scalp; shave excision biopsy, incomplete excision. PROCEDURE PERFORMED:  Wide local excision with at least 5-mm excision margin of shave biopsy site and complex closure to incision 7 cm in length x approximately 2.5 cm in width. SURGEON:  Jaiden Chappell MD    ASSISTANT:  Iraida Mcintyre. STAFF:  OR staff, room #9, main operating room at [facility] [address]:  IV sedation with local anesthesia. COMPLICATIONS:  None. SPECIMENS REMOVED:  Previous shave biopsy melanoma in situ, right apical scalp with 7-mm excision margin marking stitch 12 o'clock. IMPLANTS:  None. ESTIMATED BLOOD LOSS:  0.    IV FLUIDS:  225 mL. DRAINS:  None. INDICATIONS FOR PROCEDURE:  The patient is a pleasant 39-year-old male, who has a longstanding history of previous skin cancers including previous melanoma in situ treated on the scalp with excision. He is followed by Dr. Pily Hopkins, his dermatologist, who recently shave biopsied an additional atypical pigment lesion on the right apical scalp. The pathology report revealed melanoma in situ with inadequate excision margins. He presented to the office for definitive wide excision and closure of the excision defect. OPERATIVE PROCEDURE:  After appropriate consent was obtained, preop markings were placed. He was taken to the operating room and placed on the operative table in supine position. Satisfactory IV sedation was provided. 1% lidocaine with epinephrine was injected. The area was sterilely prepped and draped.   On examination today, he had an approximately 8 or 9 mm healing erythematous post inflammatory shave biopsy wound that was reepithelialized. There was no obvious pigmented component remaining. We marked at least a 6 or 7 mm circumferential excision margin around the previous excision site. In addition, burrow's triangles extending to the left and right side were also fashioned, resulting in an excision area of at least 7 to 8 cm in length and 2.5 cm in width. The previous shave biopsy site and its excision margin were excised in a circumferential manner with a 15 scalpel blade down to the periosteum. A 4-0 silk suture was placed at what was called 12 o'clock margin, which is most anterior short axis margin. This was sent to Pathology for permanent analysis. The 9 o'clock and 3 o'clock excision margin triangles were also excised with a fresh 15 scalpel blade and discarded. Undermining was performed in the loose area of the plane on top of the periosteum underneath the galea fascial layer anteriorly towards the forehead and a brow for a distance of about 6 or 7 cm and posteriorly toward the occiput also to about 6 or 7 cm. In addition, relaxing incisions were made in the galea fascial layer approximately 3 cm away from the excision margin anteriorly and posteriorly. Intraoperative tissue expansion was also performed with a 10-mm double skin hook. There was very limited hemostasis required and the wound was then closed in 2 layers, reapproximating the fascial layer with a 2-0 Vicryl as an inverted deep dermal stitch and a 3-0 nylon in the skin as a horizontal mattress suture. Xeroform gauze, 4x4 gauze, Kerlix roll, and an Ace wrap were placed. At the end of the procedure, sponge and needle counts were reported as correct. He was awakened and transferred to the recovery room in satisfactory and stable condition. He will be discharged home today in the care of his family with prescriptions and instructions and will follow up with me within 2 weeks.         MD MYAH Obrien/[id number]/  D:  02/25/2020 12:23  T: 02/25/2020 16:45  JOB #:  [id number]  CC:  Mary Jane Perez MD

## 2020-02-25 NOTE — ROUTINE PROCESS
Patient: Lola Naranjo. MRN: [id number]  SSN: [id number] YOB: 1940  Age: 78 y.o. Sex: male Patient is status post Procedure(s): WIDE LOCAL EXCISION MELANOMA INSITU SCALP. Surgeon(s) and Role: Darlin He MD - Primary Local/Dose/Irrigation:  See [facility] Peripheral IV 02/25/20 Anterior;Right Hand (Active) Site Assessment Clean, dry, & intact 2/25/2020 11:29 AM  
Phlebitis Assessment 0 2/25/2020 11:29 AM  
Infiltration Assessment 0 2/25/2020 11:29 AM  
Dressing Status Clean, dry, & intact 2/25/2020 11:29 AM  
Dressing Type Tape;Transparent 2/25/2020 11:29 AM  
Hub Color/Line Status Green; Infusing 2/25/2020 11:29 AM  
                 
 
 
 
Dressing/Packing:  Wound Head-Dressing Type: 4 x 4;Elastic bandage;Gauze (02/25/20 1206) Splint/Cast:  ] Other:  N/A

## 2020-02-25 NOTE — INTERVAL H&P NOTE
H&P Update: 
Claus Levittownerica Arroyo Sr. was seen and examined. History and physical has been reviewed. The patient has been examined.  There have been no significant clinical changes since the completion of the originally dated History and Physical.

## 2020-02-25 NOTE — ANESTHESIA POSTPROCEDURE EVALUATION
Procedure(s):  WIDE LOCAL EXCISION MELANOMA INSITU SCALP. MAC    Anesthesia Post Evaluation        Patient location during evaluation: PACU  Patient participation: complete - patient participated  Level of consciousness: awake  Pain management: adequate  Airway patency: patent  Anesthetic complications: no  Cardiovascular status: hemodynamically stable  Respiratory status: acceptable  Hydration status: acceptable  Comments: I have seen and evaluated the patient. The patient is ready for PACU discharge. [address], DO                         Vitals Value Taken Time   /57 2/25/2020  1:00 PM   Temp 36.6 °C (97.8 °F) 2/25/2020 12:16 PM   Pulse 65 2/25/2020  1:01 PM   Resp 13 2/25/2020  1:01 PM   SpO2 93 % 2/25/2020  1:01 PM   Vitals shown include unvalidated device data.

## 2020-12-11 ENCOUNTER — OFFICE VISIT (OUTPATIENT)
Dept: NEUROLOGY | Age: 80
End: 2020-12-11
Payer: MEDICARE

## 2020-12-11 ENCOUNTER — DOCUMENTATION ONLY (OUTPATIENT)
Dept: NEUROLOGY | Age: 80
End: 2020-12-11

## 2020-12-11 VITALS
DIASTOLIC BLOOD PRESSURE: 74 MMHG | BODY MASS INDEX: 26.69 KG/M2 | HEART RATE: 90 BPM | WEIGHT: 208 LBS | OXYGEN SATURATION: 98 % | SYSTOLIC BLOOD PRESSURE: 132 MMHG | HEIGHT: 74 IN

## 2020-12-11 DIAGNOSIS — R41.89 COGNITIVE IMPAIRMENT: Primary | ICD-10-CM

## 2020-12-11 PROCEDURE — G8419 CALC BMI OUT NRM PARAM NOF/U: HCPCS | Performed by: PSYCHIATRY & NEUROLOGY

## 2020-12-11 PROCEDURE — 1100F PTFALLS ASSESS-DOCD GE2>/YR: CPT | Performed by: PSYCHIATRY & NEUROLOGY

## 2020-12-11 PROCEDURE — G8510 SCR DEP NEG, NO PLAN REQD: HCPCS | Performed by: PSYCHIATRY & NEUROLOGY

## 2020-12-11 PROCEDURE — G8427 DOCREV CUR MEDS BY ELIG CLIN: HCPCS | Performed by: PSYCHIATRY & NEUROLOGY

## 2020-12-11 PROCEDURE — G8536 NO DOC ELDER MAL SCRN: HCPCS | Performed by: PSYCHIATRY & NEUROLOGY

## 2020-12-11 PROCEDURE — 3288F FALL RISK ASSESSMENT DOCD: CPT | Performed by: PSYCHIATRY & NEUROLOGY

## 2020-12-11 PROCEDURE — 99204 OFFICE O/P NEW MOD 45 MIN: CPT | Performed by: PSYCHIATRY & NEUROLOGY

## 2020-12-11 RX ORDER — MONTELUKAST SODIUM 10 MG/1
TABLET ORAL
COMMUNITY
Start: 2020-10-21

## 2020-12-11 NOTE — PROGRESS NOTES
Chief Complaint   Patient presents with    Memory Loss     here with salome today who states \"my aunt has noticed memory loss and comprehension issues and his PCP is recommending he get checked. \"     Visit Vitals  /74 (BP 1 Location: Right arm, BP Patient Position: Sitting)   Pulse 90   Ht 6' 2\" (1.88 m)   Wt 208 lb (94.3 kg)   SpO2 98%   BMI 26.71 kg/m²

## 2020-12-11 NOTE — PROGRESS NOTES
NEUROSCIENCE [place]   NEW PATIENT EVALUATION/CONSULTATION       PATIENT NAME: Yair Arroyo Sr. MRN: [id number]    REASON FOR CONSULTATION: History of cognitive impairment, here to reestablish care     12/11/20    HISTORY OF PRESENT ILLNESS:  Harper Arroyo Sr. is a 78 y.o. left-hand-dominant male presents to [facility] neurology clinic to reestablish care for suspected cognitive impairment/neurodegenerative disorder. History is somewhat vague but through discussion with patient's granddaughter, patient and review of records provided by Dr. Cloud Risk patient has had some degree of cognitive impairment for at least several years. Current impairment is largely manifest by difficulty with comprehension in the setting of significant hearing impairment. He otherwise had isolated incidence of leaving the sink on all night, forgetting to close doors but has never been reported to have change in functional ability, tremor, behavioral fluctuations. Family looks after finances but patient otherwise lives at his house largely independently with his grandson who has his own medical issues and has never wandered off or gotten lost.  Granddaughter's impression is that Mr. Romilda Galeazzi is reasonably well for his age does tell rambling stories but this is par for the course in the family. Apparently he was able to provide directions in precise fashion to his granddaughter who did not know how to get here. Overall some family members impression is that he is overall fine though his daughter (granddaughters aunt) seems to generate much of the concern as patient says he cannot really understand her over the phone but does better when she is in person. Apparently the patient was previously evaluated by a neurologist according to the PCPs notes though he does not recall much of this.   Was also at one point on Aricept though he has not taken it for many years and currently is taking some sort of memory pill which he gets off [employer] though he cannot recall what it is. PAST MEDICAL HISTORY:  Past Medical History:   Diagnosis Date    Arrhythmia     MURMUR    Arthritis     Benign prostate hyperplasia     Cancer ([address] 13    MELANOMA    Diabetes ([address]     Edema, lower extremity     [place] (hard of hearing)     Ill-defined condition     MAG. DEGEN. L EYE    Malignant melanoma ([address] 2018    SCALP    PUD (peptic ulcer disease)     Rectal bleeding     Skin cancer 2018    MELANOMA SCALP    Thromboembolus ([address]     L LEG X2       PAST SURGICAL HISTORY:  Past Surgical History:   Procedure Laterality Date    COLONOSCOPY N/A 2019    [facility]    HX CATARACT REMOVAL Bilateral     HX COLONOSCOPY      HX GI      COLONOSCOPY    HX HEENT      MELANOMA  REMOVED FROM SCALP X3    HX HEENT  2018    MELANOMA IN SITU LEFT SCALP     HX HERNIA REPAIR Bilateral 2016    INGUINAL    HX OTHER SURGICAL      MELANOMA EXCISION SCALP    HX UROLOGICAL      TURP    NEUROLOGICAL PROCEDURE UNLISTED      NECK SURGERY    VASCULAR SURGERY PROCEDURE UNLIST      R LEG VEIN STRIPPING    VASCULAR SURGERY PROCEDURE UNLIST      MANASA FILTER       FAMILY HISTORY:   Family History   Problem Relation Age of Onset    Diabetes Mother     Diabetes Sister     Pneumonia Father     Cancer Father         PROSTATE    No Known Problems Daughter     No Known Problems Son     Anesth Problems Neg Hx          SOCIAL HISTORY:  Social History     Socioeconomic History    Marital status:      Spouse name: Not on file    Number of children: Not on file    Years of education: Not on file    Highest education level: Not on file   Tobacco Use    Smoking status: Former Smoker     Last attempt to quit: 1980     Years since quittin.9    Smokeless tobacco: Former User     Quit date: 1968    Tobacco comment: SMOKED CIGARS FOR 10 YEARS   Substance and Sexual Activity    Alcohol use:  Yes Comment: occasionally    Drug use: No         MEDICATIONS:   Current Outpatient Medications   Medication Sig Dispense Refill    montelukast (SINGULAIR) 10 mg tablet       famotidine (PEPCID) 20 mg tablet Take 20 mg by mouth every morning.  aspirin 81 mg chewable tablet Take 81 mg by mouth every morning.  atorvastatin (LIPITOR) 10 mg tablet Take 10 mg by mouth every morning.  vit A/vit C/vit E/zinc/copper (PRESERVISION AREDS PO) Take 1 Tab by mouth two (2) times a day.  metFORMIN ER (GLUCOPHAGE XR) 750 mg tablet Take 750 mg by mouth every morning.  dutasteride (AVODART) 0.5 mg capsule Take 0.5 mg by mouth daily.  fenofibrate (LOFIBRA) 54 mg tablet Take  by mouth daily. ALLERGIES:  No Known Allergies      REVIEW OF SYSTEMS:  10 point ROS reviewed with patient. Please see scanned document under media. PHYSICAL EXAM:  Vital Signs:   Visit Vitals  /74 (BP 1 Location: Right arm, BP Patient Position: Sitting)   Pulse 90   Ht 6' 2\" (1.88 m)   Wt 208 lb (94.3 kg)   SpO2 98%   BMI 26.71 kg/m²        General Medical Exam:  General:  Well appearing, comfortable, in no apparent distress. Eyes/ENT: see cranial nerve examination. Neck: No masses appreciated. Full range of motion without tenderness. Respiratory: Ausculation deferred, breathing comfortably without use of accessory muscles, speaking in full sentences  Cardiac: Deferred, patient speaking in full sentences without evidence for respiratory impairmen   GI:  non-distended abdomen. Extremities:  No deformities, edema, or skin discoloration. Skin:  No rashes or lesions. Neurological:  · Mental Status:  Alert and oriented to person, place, and time. Attention appears intact other than that confounded by hearing impairment. Speech is clear, language is fluent without evidence for aphasia.   Patient scores a 17 out of 30 on [place] with deficiencies in registration and recall, repetition verbal fluency, serial calculations and abstraction. · Cranial Nerves:   CNII/III/IV/VI: visual fields full to confrontation, EOMI, PERRL, no ptosis or nystagmus. CN V: Facial sensation intact bilaterally  CN VII: Facial muscles symmetric and strong   CN VIII: Hearing intact to spoken voice   CN IX/X: Normal palatal movement   CN XI: Full strength shoulder shrug bilaterally   CN XII: Tongue protrusion full and midline without fasciculation or atrophy  · Motor: Normal tone and muscle bulk with no pronator drift. Appears to have grossly 5 out of 5 strength in upper and lower extremities. · MSRs: No crossed adductors or clonus. · Sensation: Normal and symmetric perception of fine touch  · Coordination: No dysmetria.  Coordination appears intact   · Gait: Primary gait and station intact     PERTINENT DATA:  None    ASSESSMENT:      Papa Parkinson is a 58-year-old right-hand-dominant male history of multiple vascular risk factors who presents to [address] [place] [address] neurology clinic to reestablish care for longstanding comprehensive deficits with concern for underlying dementia    PLAN:  Comprehensive language deficit:  Somewhat challenging to differentiate hearing impairment as a significant contributor versus underlying neurodegenerative changes  Nevertheless patient's clinical exam which is brief as well as bedside Will is most noticeable for comprehensive impairment as well as a working memory deficit  Working diagnosis would include progressive aphasia, as a first measure we will request records regarding objective evaluation of the patient's memory complaints such as MRI CT, lab work and prior evaluation by previous neurologist  We will plan to follow-up in 2 months, did not discuss driving restrictions as patient has not been getting loss, can consider discussing at future visit  Did bring up thoughts on living will, advanced directive etc.      Ryann Lyn MD       CC Referring provider:  MD Alonso Maldonado. Lillie Thornton Cheryl Oseguera, [id number]    Nadine Owens MD

## 2021-03-02 ENCOUNTER — OFFICE VISIT (OUTPATIENT)
Dept: NEUROLOGY | Age: 81
End: 2021-03-02
Payer: MEDICARE

## 2021-03-02 VITALS
SYSTOLIC BLOOD PRESSURE: 130 MMHG | DIASTOLIC BLOOD PRESSURE: 70 MMHG | HEART RATE: 80 BPM | WEIGHT: 198.8 LBS | HEIGHT: 73 IN | BODY MASS INDEX: 26.35 KG/M2 | OXYGEN SATURATION: 97 %

## 2021-03-02 DIAGNOSIS — E07.9 DISORDER OF THYROID, UNSPECIFIED: ICD-10-CM

## 2021-03-02 DIAGNOSIS — R41.89 COGNITIVE IMPAIRMENT: Primary | ICD-10-CM

## 2021-03-02 DIAGNOSIS — E53.8 DEFICIENCY OF OTHER SPECIFIED B GROUP VITAMINS: ICD-10-CM

## 2021-03-02 PROCEDURE — 3288F FALL RISK ASSESSMENT DOCD: CPT | Performed by: PSYCHIATRY & NEUROLOGY

## 2021-03-02 PROCEDURE — G8419 CALC BMI OUT NRM PARAM NOF/U: HCPCS | Performed by: PSYCHIATRY & NEUROLOGY

## 2021-03-02 PROCEDURE — G8510 SCR DEP NEG, NO PLAN REQD: HCPCS | Performed by: PSYCHIATRY & NEUROLOGY

## 2021-03-02 PROCEDURE — G8427 DOCREV CUR MEDS BY ELIG CLIN: HCPCS | Performed by: PSYCHIATRY & NEUROLOGY

## 2021-03-02 PROCEDURE — 99214 OFFICE O/P EST MOD 30 MIN: CPT | Performed by: PSYCHIATRY & NEUROLOGY

## 2021-03-02 PROCEDURE — 1100F PTFALLS ASSESS-DOCD GE2>/YR: CPT | Performed by: PSYCHIATRY & NEUROLOGY

## 2021-03-02 PROCEDURE — G8536 NO DOC ELDER MAL SCRN: HCPCS | Performed by: PSYCHIATRY & NEUROLOGY

## 2021-03-02 RX ORDER — DONEPEZIL HYDROCHLORIDE 5 MG/1
TABLET, FILM COATED ORAL
Qty: 34 TAB | Refills: 0 | Status: SHIPPED | OUTPATIENT
Start: 2021-03-02 | End: 2021-03-29 | Stop reason: SDUPTHER

## 2021-03-02 NOTE — PROGRESS NOTES
Neurology Clinic Follow up Note    Patient ID:  Brad Young.  [id number]  78 y.o.  1940      Mr. Maranda Torres is here for follow up today of  Chief Complaint   Patient presents with    Neurologic Problem    Memory Loss          Last Appointment With Me:  12/11/2020       Interval History:     Interval from prior evaluation patient has demonstrated ongoing slow decline in his comprehension. Was evaluated by audiologist who felt that his hearing was intact and that his comprehensive deficit was not related to peripheral nerve function. Otherwise he remains independent with help from family continues to drive himself without any major incidences stays active walking the dog has guns but they are locked in a safe at his house which his son has access too. PMHx/ PSHx/ FHx/ SHx:  Reviewed and unchanged previous visit. ROS:  Comprehensive review of systems negative except for as noted above. Objective:       Meds:  Current Outpatient Medications   Medication Sig Dispense Refill    donepeziL (ARICEPT) 5 mg tablet Take 1 Tab by mouth nightly for 28 days, THEN 2 Tabs nightly for 3 days. 34 Tab 0    montelukast (SINGULAIR) 10 mg tablet       famotidine (PEPCID) 20 mg tablet Take 20 mg by mouth every morning.  atorvastatin (LIPITOR) 10 mg tablet Take 10 mg by mouth every morning.  vit A/vit C/vit E/zinc/copper (PRESERVISION AREDS PO) Take 1 Tab by mouth two (2) times a day.  metFORMIN ER (GLUCOPHAGE XR) 750 mg tablet Take 750 mg by mouth every morning.  dutasteride (AVODART) 0.5 mg capsule Take 0.5 mg by mouth daily.  fenofibrate (LOFIBRA) 54 mg tablet Take  by mouth daily.  aspirin 81 mg chewable tablet Take 81 mg by mouth every morning. Exam:  Visit Vitals  /70   Pulse 80   Ht 6' 1\" (1.854 m)   Wt 198 lb 12.8 oz (90.2 kg)   SpO2 97%   BMI 26.23 kg/m²     Physical examination:    Man sitting comfortably in chair no distress.   HEENT appears grossly intact lower face not examined due to presence of mass. Neck appears supple. Cardiovascular demonstrates constant S1/S2 pulmonary demonstrates equal to bilaterally. Extremities appear warm/dry. Neurologically, patient appears alert and oriented to himself and daughter as well as doctor's office not further assessed. Attention appears somewhat impaired to casual conversation. Speech is clear language is fluent with impaired comprehension. Cranials 2-12 appear grossly intact, lower cranial nerves not well examined. Motorically patient appears to move all extremities with equal strength. Remainder of examination is deferred. LABS  Results for orders placed or performed during the hospital encounter of 02/25/20   GLUCOSE, POC   Result Value Ref Range    Glucose (POC) 97 65 - 100 mg/dL    Performed by Mamie Muñoz        Assessment:     Humaira Weston is a 78year old RH dominant male with history of progressive cognitive decline including comprehensive deficits who presents to [facility] neurology clinic for follow-up with his daughter    Plan:   Dementia:  Fever of present Alzheimer's versus a progressive aphasia  Will check TSH, B12 and MRI  We will start patient on donezepil 5 mg with a prescription of 10 mg after 4 weeks  Patient has advanced directive, living will.   Drives minimally will not restrict at this time  Guns are locked and safe    We will follow-up in 6 to 8 weeks    20 minutes were spent reviewing data, interview examining and formulating plan of care as well as with documentation in a 24-hour period      Signed:  Toño Thomas MD  3/2/2021  1:30 PM

## 2021-03-03 LAB
TSH SERPL DL<=0.005 MIU/L-ACNC: 3.13 UIU/ML (ref 0.45–4.5)
VIT B12 SERPL-MCNC: 536 PG/ML (ref 232–1245)

## 2021-03-19 ENCOUNTER — HOSPITAL ENCOUNTER (OUTPATIENT)
Dept: MRI IMAGING | Age: 81
Discharge: HOME | End: 2021-03-19
Attending: PSYCHIATRY & NEUROLOGY
Payer: MEDICARE

## 2021-03-19 DIAGNOSIS — R41.89 COGNITIVE IMPAIRMENT: ICD-10-CM

## 2021-03-19 PROCEDURE — 70551 MRI BRAIN STEM W/O DYE: CPT

## 2021-03-22 DIAGNOSIS — I67.1 CEREBRAL ANEURYSM: Primary | ICD-10-CM

## 2021-04-06 ENCOUNTER — HOSPITAL ENCOUNTER (OUTPATIENT)
Dept: MRI IMAGING | Age: 81
Discharge: HOME | End: 2021-04-06
Attending: PSYCHIATRY & NEUROLOGY
Payer: MEDICARE

## 2021-04-06 DIAGNOSIS — I67.1 CEREBRAL ANEURYSM: ICD-10-CM

## 2021-04-06 PROCEDURE — 70544 MR ANGIOGRAPHY HEAD W/O DYE: CPT

## 2021-04-12 ENCOUNTER — TELEPHONE (OUTPATIENT)
Dept: NEUROLOGY | Age: 81
End: 2021-04-12

## 2021-04-19 ENCOUNTER — TELEPHONE (OUTPATIENT)
Dept: NEUROLOGY | Age: 81
End: 2021-04-19

## 2021-04-19 NOTE — TELEPHONE ENCOUNTER
Patient's daughter calling to get MRI results. Also, has questions about the increase in the Aricept. He is taking it in the mornings but it is still keeping him up, only getting 4 hours a sleep a night. Can he discontinue the medication? Please leave a detailed message if no answer.

## 2021-04-20 RX ORDER — MEMANTINE HYDROCHLORIDE 5 MG/1
5 TABLET ORAL DAILY
Qty: 30 TAB | Refills: 0 | Status: SHIPPED | OUTPATIENT
Start: 2021-04-20

## 2022-03-19 PROBLEM — D03.4 MELANOMA IN SITU OF SCALP (HCC): Status: ACTIVE | Noted: 2018-02-07

## 2022-03-31 ENCOUNTER — HOSPITAL ENCOUNTER (OUTPATIENT)
Age: 82
Setting detail: OUTPATIENT SURGERY
Discharge: HOME | End: 2022-03-31
Attending: INTERNAL MEDICINE | Admitting: INTERNAL MEDICINE
Payer: MEDICARE

## 2022-03-31 ENCOUNTER — ANESTHESIA (OUTPATIENT)
Dept: ENDOSCOPY | Age: 82
End: 2022-03-31
Payer: MEDICARE

## 2022-03-31 ENCOUNTER — ANESTHESIA EVENT (OUTPATIENT)
Dept: ENDOSCOPY | Age: 82
End: 2022-03-31
Payer: MEDICARE

## 2022-03-31 VITALS
OXYGEN SATURATION: 97 % | BODY MASS INDEX: 23.17 KG/M2 | WEIGHT: 180.56 LBS | TEMPERATURE: 97.7 F | SYSTOLIC BLOOD PRESSURE: 108 MMHG | HEART RATE: 63 BPM | RESPIRATION RATE: 20 BRPM | HEIGHT: 74 IN | DIASTOLIC BLOOD PRESSURE: 77 MMHG

## 2022-03-31 LAB
GLUCOSE BLD STRIP.AUTO-MCNC: 100 MG/DL (ref 65–117)
SERVICE CMNT-IMP: NORMAL

## 2022-03-31 PROCEDURE — 76060000031 HC ANESTHESIA FIRST 0.5 HR: Performed by: INTERNAL MEDICINE

## 2022-03-31 PROCEDURE — 2709999900 HC NON-CHARGEABLE SUPPLY: Performed by: INTERNAL MEDICINE

## 2022-03-31 PROCEDURE — 74011250636 HC RX REV CODE- 250/636: Performed by: NURSE ANESTHETIST, CERTIFIED REGISTERED

## 2022-03-31 PROCEDURE — 82962 GLUCOSE BLOOD TEST: CPT

## 2022-03-31 PROCEDURE — 74011250636 HC RX REV CODE- 250/636: Performed by: INTERNAL MEDICINE

## 2022-03-31 PROCEDURE — 77030021593 HC FCPS BIOP ENDOSC BSC -A: Performed by: INTERNAL MEDICINE

## 2022-03-31 PROCEDURE — 74011000250 HC RX REV CODE- 250: Performed by: NURSE ANESTHETIST, CERTIFIED REGISTERED

## 2022-03-31 PROCEDURE — 88305 TISSUE EXAM BY PATHOLOGIST: CPT

## 2022-03-31 PROCEDURE — 76040000019: Performed by: INTERNAL MEDICINE

## 2022-03-31 PROCEDURE — 77030018712 HC DEV BLLN INFL BSC -B: Performed by: INTERNAL MEDICINE

## 2022-03-31 PROCEDURE — C1726 CATH, BAL DIL, NON-VASCULAR: HCPCS | Performed by: INTERNAL MEDICINE

## 2022-03-31 RX ORDER — MIDAZOLAM HYDROCHLORIDE 1 MG/ML
.25-5 INJECTION, SOLUTION INTRAMUSCULAR; INTRAVENOUS
Status: DISCONTINUED | OUTPATIENT
Start: 2022-03-31 | End: 2022-03-31 | Stop reason: HOSPADM

## 2022-03-31 RX ORDER — NALOXONE HYDROCHLORIDE 0.4 MG/ML
0.4 INJECTION, SOLUTION INTRAMUSCULAR; INTRAVENOUS; SUBCUTANEOUS
Status: DISCONTINUED | OUTPATIENT
Start: 2022-03-31 | End: 2022-03-31 | Stop reason: HOSPADM

## 2022-03-31 RX ORDER — LIDOCAINE HYDROCHLORIDE 20 MG/ML
INJECTION, SOLUTION EPIDURAL; INFILTRATION; INTRACAUDAL; PERINEURAL AS NEEDED
Status: DISCONTINUED | OUTPATIENT
Start: 2022-03-31 | End: 2022-03-31 | Stop reason: HOSPADM

## 2022-03-31 RX ORDER — PROPOFOL 10 MG/ML
INJECTION, EMULSION INTRAVENOUS AS NEEDED
Status: DISCONTINUED | OUTPATIENT
Start: 2022-03-31 | End: 2022-03-31 | Stop reason: HOSPADM

## 2022-03-31 RX ORDER — EPINEPHRINE 0.1 MG/ML
1 INJECTION INTRACARDIAC; INTRAVENOUS
Status: DISCONTINUED | OUTPATIENT
Start: 2022-03-31 | End: 2022-03-31 | Stop reason: HOSPADM

## 2022-03-31 RX ORDER — ATROPINE SULFATE 0.1 MG/ML
0.4 INJECTION INTRAVENOUS
Status: DISCONTINUED | OUTPATIENT
Start: 2022-03-31 | End: 2022-03-31 | Stop reason: HOSPADM

## 2022-03-31 RX ORDER — SODIUM CHLORIDE 9 MG/ML
50 INJECTION, SOLUTION INTRAVENOUS CONTINUOUS
Status: DISCONTINUED | OUTPATIENT
Start: 2022-03-31 | End: 2022-03-31 | Stop reason: HOSPADM

## 2022-03-31 RX ORDER — FLUMAZENIL 0.1 MG/ML
0.2 INJECTION INTRAVENOUS
Status: DISCONTINUED | OUTPATIENT
Start: 2022-03-31 | End: 2022-03-31 | Stop reason: HOSPADM

## 2022-03-31 RX ORDER — DEXTROMETHORPHAN/PSEUDOEPHED 2.5-7.5/.8
1.2 DROPS ORAL
Status: DISCONTINUED | OUTPATIENT
Start: 2022-03-31 | End: 2022-03-31 | Stop reason: HOSPADM

## 2022-03-31 RX ORDER — PANTOPRAZOLE SODIUM 40 MG/1
40 TABLET, DELAYED RELEASE ORAL DAILY
Qty: 30 TABLET | Refills: 3 | Status: SHIPPED | OUTPATIENT
Start: 2022-03-31

## 2022-03-31 RX ORDER — SODIUM CHLORIDE 9 MG/ML
INJECTION, SOLUTION INTRAVENOUS
Status: DISCONTINUED | OUTPATIENT
Start: 2022-03-31 | End: 2022-03-31 | Stop reason: HOSPADM

## 2022-03-31 RX ADMIN — SODIUM CHLORIDE: 9 INJECTION, SOLUTION INTRAVENOUS at 07:19

## 2022-03-31 RX ADMIN — SODIUM CHLORIDE 50 ML/HR: 9 INJECTION, SOLUTION INTRAVENOUS at 07:27

## 2022-03-31 RX ADMIN — PROPOFOL INJECTABLE EMULSION 40 MG: 10 INJECTION, EMULSION INTRAVENOUS at 07:56

## 2022-03-31 RX ADMIN — LIDOCAINE HYDROCHLORIDE 100 MG: 20 INJECTION, SOLUTION INTRAVENOUS at 07:50

## 2022-03-31 RX ADMIN — PROPOFOL INJECTABLE EMULSION 40 MG: 10 INJECTION, EMULSION INTRAVENOUS at 08:00

## 2022-03-31 RX ADMIN — PROPOFOL INJECTABLE EMULSION 100 MG: 10 INJECTION, EMULSION INTRAVENOUS at 07:50

## 2022-03-31 NOTE — PROGRESS NOTES
Rikki Painting .  1940  [id number]    Situation:  Verbal report received from: Effie Gutierrez RN   Procedure: Procedure(s):  ESOPHAGOGASTRODUODENOSCOPY (EGD)  ESOPHAGOGASTRODUODENAL (EGD) BIOPSY  ESOPHAGEAL DILATION    Background:    Preoperative diagnosis: DYSPHAGIA  Postoperative diagnosis: Duodenitis, duodenal ulcer, hiatal hernia. Gastritis    :  Dr. Jeff Vanegas  Assistant(s): Endoscopy RN-1: Huma Juarez RN  Endoscopy RN-2: Dilan Montero RN    Specimens:   ID Type Source Tests Collected by Time Destination   1 : gastric biopsy Preservative Gastric  Randal Holt MD 3/31/2022 0802 Pathology     H. Pylori  no    Assessment:  Intra-procedure medications     Anesthesia gave intra-procedure sedation and medications, see anesthesia flow sheet yes    Intravenous fluids: NS@ KVO     Vital signs stable yes    Abdominal assessment: round and soft yes    Recommendation:  Discharge patient per MD order yes.   Return to floor na  Family or Friend family  Permission to share finding with family or friend yes

## 2022-03-31 NOTE — PROGRESS NOTES
Endoscopy discharge instructions have been reviewed and given to patient. The patient verbalized understanding and acceptance of instructions. Dr. Bermudez Friends discussed with son procedure findings and next steps.

## 2022-03-31 NOTE — ANESTHESIA POSTPROCEDURE EVALUATION
Procedure(s):  ESOPHAGOGASTRODUODENOSCOPY (EGD)  ESOPHAGOGASTRODUODENAL (EGD) BIOPSY  ESOPHAGEAL DILATION. MAC    Anesthesia Post Evaluation        Patient location during evaluation: bedside  Level of consciousness: awake  Pain management: satisfactory to patient  Airway patency: patent  Anesthetic complications: no  Cardiovascular status: acceptable  Respiratory status: acceptable  Hydration status: acceptable        INITIAL Post-op Vital signs:   Vitals Value Taken Time   /95 03/31/22 0818   Temp     Pulse 66 03/31/22 0820   Resp 15 03/31/22 0820   SpO2 98 % 03/31/22 0820   Vitals shown include unvalidated device data.

## 2022-03-31 NOTE — PERIOP NOTES
7120  Timeout performed. Anesthesia staff at patient's bedside administering anesthesia and monitoring patients vital signs throughout procedure. See anesthesia note. Post procedure, report received from Angelic GUERRERO. [id number]  Endoscope was pre-cleaned at bedside immediately following procedure by endo tech,    Onetha Later, RN.     1257  Patient tolerated procedure. Abdomen soft and patient arousable and voices no complaints. Patient transported to endoscopy recovery area. Report given to post procedure Sara GRULLON. CRE balloon dilatation of the esophagus   18 mm Balloon inflated to 3 ATMs and held for 2 seconds. 19 mm Balloon inflated to 4.5 ATMs and held for 2 seconds. 20 mm Balloon inflated to 6 ATMs and held for 20 seconds. No subcutaneous crepitus of the chest or cervical region was noted post dilatation.

## 2022-03-31 NOTE — PROCEDURES
Gómez Orozco M.D.  [phone]           3/31/2022                EGD Operative Report  Rikki Painting Sr.  :  1940  [facility] Medical Record Number:  [id number]      Indication:  Dysphagia/odynophagia     : Lucio Moreno MD    Referring Provider:  Carlos Alberto León MD      Anesthesia/Sedation:  MAC anesthesia    Airway assessment: No airway problems anticipated    Pre-Procedural Exam:      Airway: clear, no airway problems anticipated  Heart: RRR, without gallops or rubs  Lungs: clear bilaterally without wheezes, crackles, or rhonchi  Abdomen: soft, nontender, nondistended, bowel sounds present  Mental Status: awake, alert and oriented to person, place and time       Procedure Details     After infomed consent was obtained for the procedure, with all risks and benefits of procedure explained the patient was taken to the endoscopy suite and placed in the left lateral decubitus position. Following sequential administration of sedation as per above, the endoscope was inserted into the mouth and advanced under direct vision to second portion of the duodenum. A careful inspection was made as the gastroscope was withdrawn, including a retroflexed view of the proximal stomach; findings and interventions are described below. Findings:   Esophagus:Mucosa within normal throughout the esophagus. No stricture or inflammation seen. Tortuous lumen with tertiary contractions noted. Stomach: Mild erythema and erosions noted in the antrum, otherwise mucosa within normal. Small size hiatal hernia  Duodenum/jejunum: Evidence of duodenitis noted in the distal bulb and second portion, a small ulcer seen in the distal bulb against the anterior wall with black crater. No visible vessel seen. Therapies:  esophageal dilation with 18 to 20 mm sized balloon, no tear seen    Specimens: Gastric biopsies           Complications:   None; patient tolerated the procedure well.     EBL: None.           Impression:    Tortuous esophagus with tertiary contractions                            Small hiatal hernia                            Gastritis and duodenitis                            Duodenal ulcer     Recommendations:    -Acid suppression with a proton pump inhibitor.  -Await pathology.  -Will need to remain on soft diet, eating slowly with sips of water with meals  -Will need modified barium swallow with speech and swallow evaluation if symptoms are persistent    Mona Gauthier MD

## 2022-03-31 NOTE — DISCHARGE INSTRUCTIONS
Luis Fernando Madrigal M.D.  [phone]           3/31/2022  Raheem Hou   :  1940  [facility] Medical Record Number:  [id number]        ENDOSCOPY FINDINGS:   Your endoscopy showed a mildly tortuous esophagus lumen, no stricture or inflammation seen. A small hiatal hernia seen, mild gastritis and duodenitis and a small duodenal ulcer. Dilation was performed and biopsies obtained. EGD DISCHARGE INSTRUCTIONS    DISCOMFORT:  Sore throat- throat lozenges or warm salt water gargle  redness at IV site- apply warm compress to area; if redness or soreness persist- contact your physician  Gaseous discomfort- walking, belching will help relieve any discomfort  You may not operate a vehicle for 12 hours  You may not engage in an occupation involving machinery or appliances for rest of today  You may not drink alcoholic beverages for at least 12 hours  Avoid making any critical decisions for at least 24 hour    DIET:   You may resume your soft diet    ACTIVITY  Spend the remainder of the day resting -  avoid any strenuous activity. Avoid driving or operating machinery. CALL M.D. ANY SIGN OF   Increasing pain, nausea, vomiting  Abdominal distension (swelling)  New increased bleeding (oral or rectal)  Fever (chills)  Pain in chest area  Bloody discharge from nose or mouth  Shortness of breath    Follow-up Instructions:   Call Dr. Lucrecia Sidhu for any questions or problems. Telephone # [phone]  Biopsies were obtained, the results will be available  in  5 to 7 days. We will call you to notify you of these results. Stop famotidine, take pantoprazole daily and remain on pantoprazole. If symptoms of dysphagia are persistent, will need to proceed with modified barium swallow with speech and swallow evaluation.

## 2022-03-31 NOTE — H&P
Gillian Chacon M.D.  [phone]            History and Physical       NAME:  Tobi Baugh.   :   1940   MRN:   [id number]       Referring Physician:  Dr. Brian Snell Date: 3/31/2022 7:50 AM    Chief Complaint:  Dysphagia    History of Present Illness:  Patient is a 78 y.o. who is seen for recurrent intermittent dysphagia to solids. Scheduled for EGD today. PMH:  Past Medical History:   Diagnosis Date    Arrhythmia     MURMUR    Arthritis     Benign prostate hyperplasia     Cancer ([address] 13    MELANOMA    Diabetes ([address]     Edema, lower extremity     [place] (hard of hearing)     Ill-defined condition     MAG. DEGEN. L EYE    Malignant melanoma ([address] 2018    SCALP    PUD (peptic ulcer disease)     Rectal bleeding     Skin cancer 2018    MELANOMA SCALP    Thromboembolus ([address]     L LEG X2       PSH:  Past Surgical History:   Procedure Laterality Date    COLONOSCOPY N/A 2019    COLONOSCOPY performed by Elyssa Avitia MD at [address] [facility] HX CATARACT REMOVAL Bilateral     HX COLONOSCOPY      HX GI      COLONOSCOPY    HX HEENT      MELANOMA  REMOVED FROM SCALP X3    HX HEENT  2018    MELANOMA IN SITU LEFT SCALP     HX HERNIA REPAIR Bilateral 2016    INGUINAL    HX OTHER SURGICAL      MELANOMA EXCISION SCALP    HX UROLOGICAL      TURP    NEUROLOGICAL PROCEDURE UNLISTED      NECK SURGERY    VASCULAR SURGERY PROCEDURE UNLIST      R LEG VEIN STRIPPING    VASCULAR SURGERY PROCEDURE UNLIST      MANASA FILTER       Allergies:  No Known Allergies    Home Medications:  Prior to Admission Medications   Prescriptions Last Dose Informant Patient Reported? Taking?   atorvastatin (LIPITOR) 10 mg tablet  Self Yes No   Sig: Take 10 mg by mouth every morning. dutasteride (AVODART) 0.5 mg capsule 3/30/2022 at Unknown time  Yes Yes   Sig: Take 0.5 mg by mouth daily.    famotidine (PEPCID) 20 mg tablet Unknown at Unknown time  Yes No   Sig: Take 20 mg by mouth every morning. fenofibrate (LOFIBRA) 54 mg tablet Not Taking at Unknown time  Yes No   Sig: Take  by mouth daily. Patient not taking: Reported on 3/31/2022   memantine (Namenda) 5 mg tablet Not Taking at Unknown time  No No   Sig: Take 1 Tab by mouth daily. Indications: moderate to severe Alzheimer's type dementia   Patient not taking: Reported on 3/31/2022   metFORMIN ER (GLUCOPHAGE XR) 750 mg tablet 3/30/2022 at Unknown time Self Yes Yes   Sig: Take 750 mg by mouth every morning.   montelukast (SINGULAIR) 10 mg tablet 3/30/2022 at Unknown time  Yes Yes   vit A/vit C/vit E/zinc/copper (PRESERVISION AREDS PO) 3/30/2022 at Unknown time Self Yes Yes   Sig: Take 1 Tab by mouth two (2) times a day.       Facility-Administered Medications: None       Hospital Medications:  Current Facility-Administered Medications   Medication Dose Route Frequency    0.9% sodium chloride infusion  50 mL/hr IntraVENous CONTINUOUS    midazolam (VERSED) injection 0.25-5 mg  0.25-5 mg IntraVENous Multiple    naloxone (NARCAN) injection 0.4 mg  0.4 mg IntraVENous Multiple    flumazeniL (ROMAZICON) 0.1 mg/mL injection 0.2 mg  0.2 mg IntraVENous Multiple    simethicone (MYLICON) 18ZY/5.6RV oral drops 80 mg  1.2 mL Oral Multiple    atropine injection 0.4 mg  0.4 mg IntraVENous ONCE PRN    EPINEPHrine (ADRENALIN) 0.1 mg/mL syringe 1 mg  1 mg Endoscopically ONCE PRN     Facility-Administered Medications Ordered in Other Encounters   Medication Dose Route Frequency    0.9% sodium chloride infusion   IntraVENous CONTINUOUS       Social History:  Social History     Tobacco Use    Smoking status: Former Smoker     Quit date: [employer]     Years since quittin.2    Smokeless tobacco: Former User     Quit date: 1968    Tobacco comment: SMOKED CIGARS FOR 10 YEARS   Substance Use Topics    Alcohol use: Yes     Comment: occasionally       Family History:  Family History   Problem Relation Age of Onset    Diabetes Mother  Diabetes Sister     Pneumonia Father     Cancer Father         PROSTATE    No Known Problems Daughter     No Known Problems Son     Anesth Problems Neg Hx              Review of Systems:      Constitutional: negative fever, negative chills, negative weight loss  Eyes:   negative visual changes  ENT:   negative sore throat, tongue or lip swelling  Respiratory:  negative cough, negative dyspnea  Cards:  negative for chest pain, palpitations, lower extremity edema  GI:   See HPI  :  negative for frequency, dysuria  Integument:  negative for rash and pruritus  Heme:  negative for easy bruising and gum/nose bleeding  Musculoskel: negative for myalgias,  back pain and muscle weakness  Neuro: negative for headaches, dizziness, vertigo  Psych:  negative for feelings of anxiety, depression       Objective:     Patient Vitals for the past 8 hrs:   BP Temp Pulse Resp SpO2 Height Weight   03/31/22 0658 101/75 98.1 °F (36.7 °C) 75 16 96 %     03/31/22 0649      6' 2\" (1.88 m) 81.9 kg (180 lb 8.9 oz)     No intake/output data recorded. No intake/output data recorded. EXAM:     NEURO-a&o   HEENT-wnl   LUNGS-clear    COR-regular rate and rhythym     ABD-soft , no tenderness, no rebound, good bs     EXT-no edema     Data Review     No results for input(s): WBC, HGB, HCT, PLT, HGBEXT, HCTEXT, PLTEXT in the last 72 hours. No results for input(s): NA, K, CL, CO2, BUN, CREA, GLU, PHOS, CA in the last 72 hours. No results for input(s): AP, TBIL, TP, ALB, GLOB, GGT, AML, LPSE in the last 72 hours. No lab exists for component: SGOT, GPT, AMYP, HLPSE  No results for input(s): INR, PTP, APTT, INREXT in the last 72 hours. Patient Active Problem List   Diagnosis Code    [facility] D03.4      Assessment:   · Dysphagia   Plan:   · EGD today.      Signed By: Moraima Gooden MD     3/31/2022  7:50 AM

## 2023-10-23 ENCOUNTER — OFFICE VISIT (OUTPATIENT)
Age: 83
End: 2023-10-23
Payer: MEDICARE

## 2023-10-23 VITALS
DIASTOLIC BLOOD PRESSURE: 72 MMHG | HEART RATE: 63 BPM | SYSTOLIC BLOOD PRESSURE: 128 MMHG | WEIGHT: 180 LBS | OXYGEN SATURATION: 97 % | RESPIRATION RATE: 18 BRPM | HEIGHT: 74 IN | BODY MASS INDEX: 23.1 KG/M2

## 2023-10-23 DIAGNOSIS — F03.B0 MODERATE DEMENTIA WITHOUT BEHAVIORAL DISTURBANCE, PSYCHOTIC DISTURBANCE, MOOD DISTURBANCE, OR ANXIETY, UNSPECIFIED DEMENTIA TYPE (HCC): ICD-10-CM

## 2023-10-23 DIAGNOSIS — R41.3 MEMORY LOSS, SHORT TERM: Primary | ICD-10-CM

## 2023-10-23 PROCEDURE — 99215 OFFICE O/P EST HI 40 MIN: CPT

## 2023-10-23 PROCEDURE — G8420 CALC BMI NORM PARAMETERS: HCPCS

## 2023-10-23 PROCEDURE — G8484 FLU IMMUNIZE NO ADMIN: HCPCS

## 2023-10-23 PROCEDURE — G8427 DOCREV CUR MEDS BY ELIG CLIN: HCPCS

## 2023-10-23 PROCEDURE — 1036F TOBACCO NON-USER: CPT

## 2023-10-23 PROCEDURE — 1123F ACP DISCUSS/DSCN MKR DOCD: CPT

## 2023-10-23 RX ORDER — APIXABAN 5 MG/1
TABLET, FILM COATED ORAL
COMMUNITY
Start: 2023-10-03

## 2023-10-23 ASSESSMENT — PATIENT HEALTH QUESTIONNAIRE - PHQ9
SUM OF ALL RESPONSES TO PHQ QUESTIONS 1-9: 0
1. LITTLE INTEREST OR PLEASURE IN DOING THINGS: 0
SUM OF ALL RESPONSES TO PHQ QUESTIONS 1-9: 0
SUM OF ALL RESPONSES TO PHQ QUESTIONS 1-9: 0
2. FEELING DOWN, DEPRESSED OR HOPELESS: 0
SUM OF ALL RESPONSES TO PHQ9 QUESTIONS 1 & 2: 0
SUM OF ALL RESPONSES TO PHQ QUESTIONS 1-9: 0

## 2023-10-23 NOTE — PROGRESS NOTES
Chief Complaint   Patient presents with    Follow-up     Cerebral aneurysm. Family noticing memory loss       HPI    Mr Gabriel Liao is a 78year old male here for follow up. He is a new patient for me. He was last seen by Dr Andrae Vegas on 3/2/21 for memory loss. He is on Aricept 10 mg nightly at the time of last visit. MRI brain on 3/19/21 showed volume loss and chronic microvascular disease. No intercranial abnormalities. MRA brain showed a tortuous left DOC. He is here today with his son and daughter-in-law who he now lives with. Since his last time here he is not taking the Aricept anymore. There was a hospitalization for a DVT and he was taking off everything and his family never restarted it. Per his son he is doing ok. Memory is getting worse. His short term is really bad. He is very hard of hearing which makes him more fustrated. He is home alone for about 8 hours a day when they are at work, but he does ok. If its any longer then that then he gets worried and scared. He does not wander or drive. He is independent with all ADL's. Takes his meds and eats and drinks fine. He sleeps well. They have him taking a OTC sleep aid. They tell me that he does have a few mixed drinks or beers at times. Denies falls. Daughter says that he does get frustrated and mad at times especially when he cant hear them or remember things. Anytime they change his routine or sleep somewhere else he gets upset. He enjoys watching tv and reading the paper. He will walk outside and get the mail. Review of Systems   HENT:  Positive for hearing loss. Musculoskeletal:  Negative for gait problem. Psychiatric/Behavioral:  Positive for confusion and sleep disturbance. The patient is nervous/anxious.           Past Medical History:   Diagnosis Date    Arrhythmia     MURMUR    Arthritis     Benign prostate hyperplasia     Cancer ([address]) 4/26/13    MELANOMA    Diabetes ([address])     Edema, lower extremity     [place] (hard of hearing)

## (undated) DEVICE — SUTURE PERMAHAND SZ 4-0 L18IN NONABSORBABLE BLK L19MM PS-2 1677G

## (undated) DEVICE — BAG SPEC BIOHZRD 10 X 10 IN --

## (undated) DEVICE — INSULATED NEEDLE ELECTRODE: Brand: EDGE

## (undated) DEVICE — SOLIDIFIER MEDC 1200ML -- CONVERT TO 356117

## (undated) DEVICE — INTENDED FOR TISSUE SEPARATION, AND OTHER PROCEDURES THAT REQUIRE A SHARP SURGICAL BLADE TO PUNCTURE OR CUT.: Brand: BARD-PARKER ® CARBON RIB-BACK BLADES

## (undated) DEVICE — SYRINGE 50ML E/T

## (undated) DEVICE — NEEDLE HYPO 25GA L1.5IN BVL ORIENTED ECLIPSE

## (undated) DEVICE — Device

## (undated) DEVICE — STERILE POLYISOPRENE POWDER-FREE SURGICAL GLOVES: Brand: PROTEXIS

## (undated) DEVICE — SET ADMIN 16ML TBNG L100IN 2 Y INJ SITE IV PIGGY BK DISP

## (undated) DEVICE — REM POLYHESIVE ADULT PATIENT RETURN ELECTRODE: Brand: VALLEYLAB

## (undated) DEVICE — SET GRAV CK VLV NEEDLESS ST 3 GANGED 4WAY STPCOCK HI FLO 10

## (undated) DEVICE — CONTAINER SPEC 20 ML LID NEUT BUFF FORMALIN 10 % POLYPR STS

## (undated) DEVICE — BANDAGE,GAUZE,BULKEE II,4.5"X4.1YD,STRL: Brand: MEDLINE

## (undated) DEVICE — POLYP TRAP: Brand: TRAPEASE®

## (undated) DEVICE — SYR 10ML LUER LOK 1/5ML GRAD --

## (undated) DEVICE — KIT COLON W/ 1.1OZ LUB AND 2 END

## (undated) DEVICE — BAG BELONG PT PERS CLEAR HANDL

## (undated) DEVICE — 1200 GUARD II KIT W/5MM TUBE W/O VAC TUBE: Brand: GUARDIAN

## (undated) DEVICE — SUTURE COAT VCRL SZ 3-0 L18IN ABSRB VLT L19MM FS-2 3/8 CIR J393H

## (undated) DEVICE — SUT ETHLN 4-0 18IN P3 GRN --

## (undated) DEVICE — BITEBLOCK ENDOSCP 60FR MAXI WHT POLYETH STURDY W/ VELC WVN

## (undated) DEVICE — PACK,EENT,TURBAN DRAPE,PK II: Brand: MEDLINE

## (undated) DEVICE — SNARE ENDOSCP M L240CM W27MM SHTH DIA2.4MM CHN 2.8MM OVL

## (undated) DEVICE — SPONGE LAP 18X18IN STRL -- 5/PK

## (undated) DEVICE — CANN NASAL O2 CAPNOGRAPHY AD -- FILTERLINE

## (undated) DEVICE — 3M™ TEGADERM™ TRANSPARENT FILM DRESSING FRAME STYLE, 1626W, 4 IN X 4-3/4 IN (10 CM X 12 CM), 50/CT 4CT/CASE: Brand: 3M™ TEGADERM™

## (undated) DEVICE — DRESSING,GAUZE,XEROFORM,CURAD,5"X9",ST: Brand: CURAD

## (undated) DEVICE — SUTURE ETHLN SZ 4-0 L18IN NONABSORBABLE BLK L19MM PS-2 3/8 1667H

## (undated) DEVICE — SOLUTION SCRB 2OZ 10% POVIDONE IOD ANTIMIC BTL

## (undated) DEVICE — GAUZE SPONGES,12 PLY: Brand: CURITY

## (undated) DEVICE — SYR ASSEMB INFL BLLN 60ML --

## (undated) DEVICE — ADULT SPO2 SENSOR,REMANUFACTURED,REPROCESSED DEVICE FOR SINGLE USE; REPROCESSED BY COVIDIEN LLC: Brand: NELLCOR

## (undated) DEVICE — ELECTRODE,RADIOTRANSLUCENT,FOAM,3PK: Brand: MEDLINE

## (undated) DEVICE — SPONGE GZ W4XL4IN COT 12 PLY TYP VII WVN C FLD DSGN

## (undated) DEVICE — CATH IV AUTOGRD BC PNK 20GA 25 -- INSYTE

## (undated) DEVICE — HANDLE LT SNAP ON ULT DURABLE LENS FOR TRUMPF ALC DISPOSABLE

## (undated) DEVICE — 3M™ CUROS™ DISINFECTING CAP FOR NEEDLELESS CONNECTORS 270/CARTON 20 CARTONS/CASE CFF1-270: Brand: CUROS™

## (undated) DEVICE — SIMPLICITY FLUFF UNDERPAD 23X36, MODERATE: Brand: SIMPLICITY

## (undated) DEVICE — FORCEPS BX L240CM JAW DIA2.8MM L CAP W/ NDL MIC MESH TOOTH

## (undated) DEVICE — CATH IV AUTOGRD BC BLU 22GA 25 -- INSYTE

## (undated) DEVICE — OCCLUSIVE GAUZE STRIP,3% BISMUTH TRIBROMOPHENATE IN PETROLATUM BLEND: Brand: XEROFORM

## (undated) DEVICE — KENDALL SCD EXPRESS SLEEVES, KNEE LENGTH, MEDIUM: Brand: KENDALL SCD

## (undated) DEVICE — (D)CUP DENT 7.5OZ PLAS DSTY -- DISC BY MFR USE ITEM 341725

## (undated) DEVICE — INFECTION CONTROL KIT SYS

## (undated) DEVICE — SUTURE 2-0 VCRL CTD FS-1 J443H

## (undated) DEVICE — ESOPHAGEAL BALLOON DILATATION CATHETER: Brand: CRE FIXED WIRE

## (undated) DEVICE — ADULT SPO2 SENSOR: Brand: NELLCOR

## (undated) DEVICE — SOLUTION IV 1000ML 0.9% SOD CHL

## (undated) DEVICE — SUT ETHLN 3-0 18IN PS2 BLK --

## (undated) DEVICE — KERLIX BANDAGE ROLL: Brand: KERLIX

## (undated) DEVICE — CUFF RMFG BP INF SZ 11 DISP -- LAWSON OEM ITEM 238915

## (undated) DEVICE — DRAPE,REIN 53X77,STERILE: Brand: MEDLINE

## (undated) DEVICE — KENDALL RADIOLUCENT FOAM MONITORING ELECTRODE -RECTANGULAR SHAPE: Brand: KENDALL

## (undated) DEVICE — GARMENT,MEDLINE,DVT,INT,CALF,MED, GEN2: Brand: MEDLINE